# Patient Record
Sex: FEMALE | Race: WHITE | URBAN - METROPOLITAN AREA
[De-identification: names, ages, dates, MRNs, and addresses within clinical notes are randomized per-mention and may not be internally consistent; named-entity substitution may affect disease eponyms.]

---

## 2017-06-07 ENCOUNTER — APPOINTMENT (RX ONLY)
Age: 62
Setting detail: DERMATOLOGY
End: 2017-06-07

## 2017-06-07 ENCOUNTER — APPOINTMENT (OUTPATIENT)
Age: 62
Setting detail: DERMATOLOGY
End: 2017-06-07

## 2017-06-07 VITALS
SYSTOLIC BLOOD PRESSURE: 132 MMHG | DIASTOLIC BLOOD PRESSURE: 78 MMHG | SYSTOLIC BLOOD PRESSURE: 132 MMHG | DIASTOLIC BLOOD PRESSURE: 78 MMHG

## 2017-06-07 DIAGNOSIS — Z85.820 PERSONAL HISTORY OF MALIGNANT MELANOMA OF SKIN: ICD-10-CM

## 2017-06-07 DIAGNOSIS — L82.1 OTHER SEBORRHEIC KERATOSIS: ICD-10-CM

## 2017-06-07 DIAGNOSIS — D22 MELANOCYTIC NEVI: ICD-10-CM

## 2017-06-07 PROBLEM — D22.5 MELANOCYTIC NEVI OF TRUNK: Status: ACTIVE | Noted: 2017-06-07

## 2017-06-07 PROBLEM — I10 ESSENTIAL (PRIMARY) HYPERTENSION: Status: ACTIVE | Noted: 2017-06-07

## 2017-06-07 PROCEDURE — ? COUNSELING

## 2017-06-07 PROCEDURE — 99214 OFFICE O/P EST MOD 30 MIN: CPT

## 2017-06-07 ASSESSMENT — LOCATION DETAILED DESCRIPTION DERM
LOCATION DETAILED: EPIGASTRIC SKIN
LOCATION DETAILED: RIGHT MEDIAL UPPER BACK
LOCATION DETAILED: RIGHT SUPERIOR MEDIAL MIDBACK
LOCATION DETAILED: RIGHT PROXIMAL POSTERIOR UPPER ARM
LOCATION DETAILED: RIGHT MEDIAL UPPER BACK
LOCATION DETAILED: RIGHT SUPERIOR MEDIAL MIDBACK
LOCATION DETAILED: RIGHT PROXIMAL POSTERIOR UPPER ARM
LOCATION DETAILED: EPIGASTRIC SKIN

## 2017-06-07 ASSESSMENT — LOCATION ZONE DERM
LOCATION ZONE: ARM
LOCATION ZONE: TRUNK
LOCATION ZONE: TRUNK
LOCATION ZONE: ARM

## 2017-06-07 ASSESSMENT — LOCATION SIMPLE DESCRIPTION DERM
LOCATION SIMPLE: RIGHT UPPER BACK
LOCATION SIMPLE: RIGHT LOWER BACK
LOCATION SIMPLE: RIGHT UPPER BACK
LOCATION SIMPLE: RIGHT POSTERIOR UPPER ARM
LOCATION SIMPLE: RIGHT LOWER BACK
LOCATION SIMPLE: RIGHT POSTERIOR UPPER ARM
LOCATION SIMPLE: ABDOMEN
LOCATION SIMPLE: ABDOMEN

## 2017-06-07 NOTE — PROCEDURE: MIPS QUALITY
Quality 138: Melanoma: Coordination Of Care: A treatment plan was communicated to the physicians providing continuing care within one month of diagnosis outlining: diagnosis, tumor thickness and a plan for surgery or alternate care.
Quality 226: Preventive Care And Screening: Tobacco Use: Screening And Cessation Intervention: Patient screened for tobacco and never smoked
Quality 431: Preventive Care And Screening: Unhealthy Alcohol Use - Screening: Patient screened for unhealthy alcohol use using a single question and scores less than 2 times per year
Quality 397: Melanoma: Reporting: The pathology report includes a pT Category and statement on thickness and ulceration for pT1, mitotic rate.
Quality 111:Pneumonia Vaccination Status For Older Adults: Pneumococcal Vaccination not Administered or Previously Received, Reason not Otherwise Specified
Quality 130: Documentation Of Current Medications In The Medical Record: Current Medications Documented
Quality 224: Stage 0-Iic Melanoma: Overutilization Of Imaging Studies For Only Stage 0-Iic Melanoma: None of the following diagnostic imaging studies ordered: chest X-ray, CT, Ultrasound, MRI, PET, or nuclear medicine scans (ML)
Detail Level: Detailed
Quality 110: Preventive Care And Screening: Influenza Immunization: Influenza Immunization Ordered or Recommended, but not Administered due to system reason
Quality 137: Melanoma: Continuity Of Care - Recall System: Patient information entered into a recall system that includes: target date for the next exam specified AND a process to follow up with patients regarding missed or unscheduled appointments

## 2017-06-07 NOTE — PROCEDURE: MIPS QUALITY
Quality 397: Melanoma: Reporting: The pathology report includes a pT Category and statement on thickness and ulceration for pT1, mitotic rate.
Quality 137: Melanoma: Continuity Of Care - Recall System: Patient information entered into a recall system that includes: target date for the next exam specified AND a process to follow up with patients regarding missed or unscheduled appointments
Quality 431: Preventive Care And Screening: Unhealthy Alcohol Use - Screening: Patient screened for unhealthy alcohol use using a single question and scores less than 2 times per year
Quality 138: Melanoma: Coordination Of Care: A treatment plan was communicated to the physicians providing continuing care within one month of diagnosis outlining: diagnosis, tumor thickness and a plan for surgery or alternate care.
Quality 111:Pneumonia Vaccination Status For Older Adults: Pneumococcal Vaccination not Administered or Previously Received, Reason not Otherwise Specified
Detail Level: Detailed
Quality 224: Stage 0-Iic Melanoma: Overutilization Of Imaging Studies For Only Stage 0-Iic Melanoma: None of the following diagnostic imaging studies ordered: chest X-ray, CT, Ultrasound, MRI, PET, or nuclear medicine scans (ML)
Quality 130: Documentation Of Current Medications In The Medical Record: Current Medications Documented
Quality 226: Preventive Care And Screening: Tobacco Use: Screening And Cessation Intervention: Patient screened for tobacco and never smoked
Quality 110: Preventive Care And Screening: Influenza Immunization: Influenza Immunization Ordered or Recommended, but not Administered due to system reason

## 2018-06-13 ENCOUNTER — APPOINTMENT (OUTPATIENT)
Age: 63
Setting detail: DERMATOLOGY
End: 2018-06-13

## 2018-06-13 ENCOUNTER — APPOINTMENT (RX ONLY)
Age: 63
Setting detail: DERMATOLOGY
End: 2018-06-13

## 2018-06-13 VITALS
SYSTOLIC BLOOD PRESSURE: 170 MMHG | DIASTOLIC BLOOD PRESSURE: 80 MMHG | SYSTOLIC BLOOD PRESSURE: 170 MMHG | DIASTOLIC BLOOD PRESSURE: 80 MMHG

## 2018-06-13 DIAGNOSIS — L57.0 ACTINIC KERATOSIS: ICD-10-CM

## 2018-06-13 DIAGNOSIS — D18.0 HEMANGIOMA: ICD-10-CM

## 2018-06-13 DIAGNOSIS — D22 MELANOCYTIC NEVI: ICD-10-CM

## 2018-06-13 DIAGNOSIS — Z85.820 PERSONAL HISTORY OF MALIGNANT MELANOMA OF SKIN: ICD-10-CM

## 2018-06-13 DIAGNOSIS — L81.4 OTHER MELANIN HYPERPIGMENTATION: ICD-10-CM

## 2018-06-13 PROBLEM — D22.71 MELANOCYTIC NEVI OF RIGHT LOWER LIMB, INCLUDING HIP: Status: ACTIVE | Noted: 2018-06-13

## 2018-06-13 PROBLEM — D22.61 MELANOCYTIC NEVI OF RIGHT UPPER LIMB, INCLUDING SHOULDER: Status: ACTIVE | Noted: 2018-06-13

## 2018-06-13 PROBLEM — D22.39 MELANOCYTIC NEVI OF OTHER PARTS OF FACE: Status: ACTIVE | Noted: 2018-06-13

## 2018-06-13 PROBLEM — I10 ESSENTIAL (PRIMARY) HYPERTENSION: Status: ACTIVE | Noted: 2018-06-13

## 2018-06-13 PROBLEM — D22.62 MELANOCYTIC NEVI OF LEFT UPPER LIMB, INCLUDING SHOULDER: Status: ACTIVE | Noted: 2018-06-13

## 2018-06-13 PROBLEM — D22.72 MELANOCYTIC NEVI OF LEFT LOWER LIMB, INCLUDING HIP: Status: ACTIVE | Noted: 2018-06-13

## 2018-06-13 PROBLEM — D18.01 HEMANGIOMA OF SKIN AND SUBCUTANEOUS TISSUE: Status: ACTIVE | Noted: 2018-06-13

## 2018-06-13 PROBLEM — D22.5 MELANOCYTIC NEVI OF TRUNK: Status: ACTIVE | Noted: 2018-06-13

## 2018-06-13 PROCEDURE — ? COUNSELING

## 2018-06-13 PROCEDURE — 17000 DESTRUCT PREMALG LESION: CPT

## 2018-06-13 PROCEDURE — ? LIQUID NITROGEN

## 2018-06-13 PROCEDURE — 99214 OFFICE O/P EST MOD 30 MIN: CPT | Mod: 25

## 2018-06-13 ASSESSMENT — LOCATION ZONE DERM
LOCATION ZONE: LEG
LOCATION ZONE: TRUNK
LOCATION ZONE: TRUNK
LOCATION ZONE: LEG
LOCATION ZONE: FACE
LOCATION ZONE: ARM
LOCATION ZONE: FACE
LOCATION ZONE: ARM

## 2018-06-13 ASSESSMENT — LOCATION SIMPLE DESCRIPTION DERM
LOCATION SIMPLE: LEFT THIGH
LOCATION SIMPLE: LEFT THIGH
LOCATION SIMPLE: UPPER BACK
LOCATION SIMPLE: LEFT POSTERIOR UPPER ARM
LOCATION SIMPLE: LEFT POSTERIOR UPPER ARM
LOCATION SIMPLE: ABDOMEN
LOCATION SIMPLE: RIGHT PRETIBIAL REGION
LOCATION SIMPLE: RIGHT FOREARM
LOCATION SIMPLE: RIGHT PRETIBIAL REGION
LOCATION SIMPLE: UPPER BACK
LOCATION SIMPLE: RIGHT FOREARM
LOCATION SIMPLE: CHEST
LOCATION SIMPLE: RIGHT CHEEK
LOCATION SIMPLE: CHEST
LOCATION SIMPLE: ABDOMEN
LOCATION SIMPLE: RIGHT CHEEK
LOCATION SIMPLE: LEFT PRETIBIAL REGION
LOCATION SIMPLE: RIGHT POSTERIOR UPPER ARM
LOCATION SIMPLE: RIGHT POSTERIOR UPPER ARM
LOCATION SIMPLE: LEFT PRETIBIAL REGION

## 2018-06-13 ASSESSMENT — LOCATION DETAILED DESCRIPTION DERM
LOCATION DETAILED: RIGHT PROXIMAL POSTERIOR UPPER ARM
LOCATION DETAILED: RIGHT PROXIMAL RADIAL DORSAL FOREARM
LOCATION DETAILED: RIGHT LATERAL ABDOMEN
LOCATION DETAILED: LEFT PROXIMAL POSTERIOR UPPER ARM
LOCATION DETAILED: RIGHT PROXIMAL PRETIBIAL REGION
LOCATION DETAILED: RIGHT PROXIMAL RADIAL DORSAL FOREARM
LOCATION DETAILED: SUPERIOR THORACIC SPINE
LOCATION DETAILED: EPIGASTRIC SKIN
LOCATION DETAILED: RIGHT INFERIOR CENTRAL MALAR CHEEK
LOCATION DETAILED: LEFT DISTAL PRETIBIAL REGION
LOCATION DETAILED: LEFT ANTERIOR DISTAL THIGH
LOCATION DETAILED: RIGHT PROXIMAL PRETIBIAL REGION
LOCATION DETAILED: RIGHT INFERIOR CENTRAL MALAR CHEEK
LOCATION DETAILED: RIGHT PROXIMAL POSTERIOR UPPER ARM
LOCATION DETAILED: INFERIOR THORACIC SPINE
LOCATION DETAILED: LEFT PROXIMAL POSTERIOR UPPER ARM
LOCATION DETAILED: EPIGASTRIC SKIN
LOCATION DETAILED: INFERIOR THORACIC SPINE
LOCATION DETAILED: RIGHT MEDIAL SUPERIOR CHEST
LOCATION DETAILED: RIGHT SUPERIOR CENTRAL MALAR CHEEK
LOCATION DETAILED: RIGHT SUPERIOR CENTRAL MALAR CHEEK
LOCATION DETAILED: MIDDLE STERNUM
LOCATION DETAILED: RIGHT MEDIAL SUPERIOR CHEST
LOCATION DETAILED: RIGHT LATERAL ABDOMEN
LOCATION DETAILED: LEFT ANTERIOR DISTAL THIGH
LOCATION DETAILED: LEFT DISTAL PRETIBIAL REGION
LOCATION DETAILED: MIDDLE STERNUM
LOCATION DETAILED: SUPERIOR THORACIC SPINE

## 2018-06-13 NOTE — PROCEDURE: MIPS QUALITY
Quality 226: Preventive Care And Screening: Tobacco Use: Screening And Cessation Intervention: Patient screened for tobacco and never smoked
Quality 431: Preventive Care And Screening: Unhealthy Alcohol Use - Screening: Patient screened for unhealthy alcohol use using a single question and scores less than 2 times per year
Quality 110: Preventive Care And Screening: Influenza Immunization: Influenza Immunization Ordered or Recommended, but not Administered due to system reason
Quality 111:Pneumonia Vaccination Status For Older Adults: Pneumococcal Vaccination not Administered or Previously Received, Reason not Otherwise Specified
Detail Level: Detailed

## 2018-06-13 NOTE — PROCEDURE: MIPS QUALITY
Quality 431: Preventive Care And Screening: Unhealthy Alcohol Use - Screening: Patient screened for unhealthy alcohol use using a single question and scores less than 2 times per year
Quality 110: Preventive Care And Screening: Influenza Immunization: Influenza Immunization Ordered or Recommended, but not Administered due to system reason
Quality 111:Pneumonia Vaccination Status For Older Adults: Pneumococcal Vaccination not Administered or Previously Received, Reason not Otherwise Specified
Quality 226: Preventive Care And Screening: Tobacco Use: Screening And Cessation Intervention: Patient screened for tobacco and never smoked
Detail Level: Detailed

## 2020-01-30 RX ORDER — SODIUM CHLORIDE 0.9 % (FLUSH) 0.9 %
5-40 SYRINGE (ML) INJECTION AS NEEDED
Status: CANCELLED | OUTPATIENT
Start: 2020-01-30

## 2020-01-30 RX ORDER — SODIUM CHLORIDE 0.9 % (FLUSH) 0.9 %
5-40 SYRINGE (ML) INJECTION EVERY 8 HOURS
Status: CANCELLED | OUTPATIENT
Start: 2020-01-30

## 2020-02-05 ENCOUNTER — HOSPITAL ENCOUNTER (OUTPATIENT)
Dept: LAB | Age: 65
Discharge: HOME | End: 2020-02-05
Payer: MEDICARE

## 2020-02-05 LAB — POTASSIUM SERPL-SCNC: 3.3 MMOL/L (ref 3.5–5.1)

## 2020-02-05 PROCEDURE — 84132 ASSAY OF SERUM POTASSIUM: CPT

## 2020-02-05 PROCEDURE — 36415 COLL VENOUS BLD VENIPUNCTURE: CPT

## 2020-02-06 ENCOUNTER — ANESTHESIA EVENT (OUTPATIENT)
Dept: SURGERY | Age: 65
End: 2020-02-06
Payer: MEDICARE

## 2020-02-07 ENCOUNTER — ANESTHESIA (OUTPATIENT)
Dept: SURGERY | Age: 65
End: 2020-02-07
Payer: MEDICARE

## 2020-02-07 ENCOUNTER — HOSPITAL ENCOUNTER (OUTPATIENT)
Age: 65
Setting detail: OUTPATIENT SURGERY
Discharge: HOME | End: 2020-02-07
Attending: ORTHOPAEDIC SURGERY | Admitting: ORTHOPAEDIC SURGERY
Payer: MEDICARE

## 2020-02-07 VITALS
BODY MASS INDEX: 30.51 KG/M2 | WEIGHT: 189 LBS | OXYGEN SATURATION: 93 % | DIASTOLIC BLOOD PRESSURE: 78 MMHG | TEMPERATURE: 98 F | HEART RATE: 83 BPM | SYSTOLIC BLOOD PRESSURE: 140 MMHG | RESPIRATION RATE: 14 BRPM

## 2020-02-07 LAB — POTASSIUM BLD-SCNC: 3.8 MMOL/L (ref 3.5–5.1)

## 2020-02-07 PROCEDURE — 77030004453 HC BUR SHV STRY -B: Performed by: ORTHOPAEDIC SURGERY

## 2020-02-07 PROCEDURE — 77030010509 HC AIRWY LMA MSK TELE -A: Performed by: ANESTHESIOLOGY

## 2020-02-07 PROCEDURE — 74011250636 HC RX REV CODE- 250/636: Performed by: ANESTHESIOLOGY

## 2020-02-07 PROCEDURE — 76010010054 HC POST OP PAIN BLOCK: Performed by: ORTHOPAEDIC SURGERY

## 2020-02-07 PROCEDURE — 77030019605: Performed by: ORTHOPAEDIC SURGERY

## 2020-02-07 PROCEDURE — 76210000063 HC OR PH I REC FIRST 0.5 HR: Performed by: ORTHOPAEDIC SURGERY

## 2020-02-07 PROCEDURE — 76942 ECHO GUIDE FOR BIOPSY: CPT | Performed by: ORTHOPAEDIC SURGERY

## 2020-02-07 PROCEDURE — 74011250636 HC RX REV CODE- 250/636: Performed by: ORTHOPAEDIC SURGERY

## 2020-02-07 PROCEDURE — 76060000033 HC ANESTHESIA 1 TO 1.5 HR: Performed by: ORTHOPAEDIC SURGERY

## 2020-02-07 PROCEDURE — 77030040936 HC WND COBLATN S&N -C: Performed by: ORTHOPAEDIC SURGERY

## 2020-02-07 PROCEDURE — 74011000250 HC RX REV CODE- 250: Performed by: NURSE ANESTHETIST, CERTIFIED REGISTERED

## 2020-02-07 PROCEDURE — 74011250636 HC RX REV CODE- 250/636: Performed by: NURSE ANESTHETIST, CERTIFIED REGISTERED

## 2020-02-07 PROCEDURE — 74011000250 HC RX REV CODE- 250: Performed by: ANESTHESIOLOGY

## 2020-02-07 PROCEDURE — C1713 ANCHOR/SCREW BN/BN,TIS/BN: HCPCS | Performed by: ORTHOPAEDIC SURGERY

## 2020-02-07 PROCEDURE — 77030018836 HC SOL IRR NACL ICUM -A: Performed by: ORTHOPAEDIC SURGERY

## 2020-02-07 PROCEDURE — 84132 ASSAY OF SERUM POTASSIUM: CPT

## 2020-02-07 PROCEDURE — 77030002933 HC SUT MCRYL J&J -A: Performed by: ORTHOPAEDIC SURGERY

## 2020-02-07 PROCEDURE — 77030040361 HC SLV COMPR DVT MDII -B: Performed by: ORTHOPAEDIC SURGERY

## 2020-02-07 PROCEDURE — 77030003666 HC NDL SPINAL BD -A: Performed by: ORTHOPAEDIC SURGERY

## 2020-02-07 PROCEDURE — 76210000020 HC REC RM PH II FIRST 0.5 HR: Performed by: ORTHOPAEDIC SURGERY

## 2020-02-07 PROCEDURE — 77030003602 HC NDL NRV BLK BBMI -B: Performed by: ANESTHESIOLOGY

## 2020-02-07 PROCEDURE — 74011250637 HC RX REV CODE- 250/637: Performed by: ANESTHESIOLOGY

## 2020-02-07 PROCEDURE — 76010000161 HC OR TIME 1 TO 1.5 HR INTENSV-TIER 1: Performed by: ORTHOPAEDIC SURGERY

## 2020-02-07 PROCEDURE — 77030006891 HC BLD SHV RESECT STRY -B: Performed by: ORTHOPAEDIC SURGERY

## 2020-02-07 RX ORDER — OXYCODONE HYDROCHLORIDE 5 MG/1
5 TABLET ORAL
Status: DISCONTINUED | OUTPATIENT
Start: 2020-02-07 | End: 2020-02-07 | Stop reason: HOSPADM

## 2020-02-07 RX ORDER — LIDOCAINE HYDROCHLORIDE 10 MG/ML
0.1 INJECTION INFILTRATION; PERINEURAL AS NEEDED
Status: DISCONTINUED | OUTPATIENT
Start: 2020-02-07 | End: 2020-02-07 | Stop reason: HOSPADM

## 2020-02-07 RX ORDER — ONDANSETRON 2 MG/ML
4 INJECTION INTRAMUSCULAR; INTRAVENOUS
Status: DISCONTINUED | OUTPATIENT
Start: 2020-02-07 | End: 2020-02-07 | Stop reason: HOSPADM

## 2020-02-07 RX ORDER — ALBUTEROL SULFATE 0.83 MG/ML
2.5 SOLUTION RESPIRATORY (INHALATION) AS NEEDED
Status: DISCONTINUED | OUTPATIENT
Start: 2020-02-07 | End: 2020-02-07 | Stop reason: HOSPADM

## 2020-02-07 RX ORDER — ACETAMINOPHEN 500 MG
1000 TABLET ORAL ONCE
Status: COMPLETED | OUTPATIENT
Start: 2020-02-07 | End: 2020-02-07

## 2020-02-07 RX ORDER — PROPOFOL 10 MG/ML
INJECTION, EMULSION INTRAVENOUS AS NEEDED
Status: DISCONTINUED | OUTPATIENT
Start: 2020-02-07 | End: 2020-02-07 | Stop reason: HOSPADM

## 2020-02-07 RX ORDER — BUPIVACAINE HYDROCHLORIDE 5 MG/ML
INJECTION, SOLUTION EPIDURAL; INTRACAUDAL
Status: COMPLETED | OUTPATIENT
Start: 2020-02-07 | End: 2020-02-07

## 2020-02-07 RX ORDER — EPINEPHRINE 0.1 MG/ML
INJECTION INTRACARDIAC; INTRAVENOUS AS NEEDED
Status: DISCONTINUED | OUTPATIENT
Start: 2020-02-07 | End: 2020-02-07 | Stop reason: HOSPADM

## 2020-02-07 RX ORDER — CEFAZOLIN SODIUM/WATER 2 G/20 ML
2 SYRINGE (ML) INTRAVENOUS ONCE
Status: COMPLETED | OUTPATIENT
Start: 2020-02-07 | End: 2020-02-07

## 2020-02-07 RX ORDER — SODIUM CHLORIDE, SODIUM LACTATE, POTASSIUM CHLORIDE, CALCIUM CHLORIDE 600; 310; 30; 20 MG/100ML; MG/100ML; MG/100ML; MG/100ML
1000 INJECTION, SOLUTION INTRAVENOUS CONTINUOUS
Status: DISCONTINUED | OUTPATIENT
Start: 2020-02-07 | End: 2020-02-07 | Stop reason: HOSPADM

## 2020-02-07 RX ORDER — SODIUM CHLORIDE 0.9 % (FLUSH) 0.9 %
5-40 SYRINGE (ML) INJECTION AS NEEDED
Status: DISCONTINUED | OUTPATIENT
Start: 2020-02-07 | End: 2020-02-07 | Stop reason: HOSPADM

## 2020-02-07 RX ORDER — MIDAZOLAM HYDROCHLORIDE 1 MG/ML
2 INJECTION, SOLUTION INTRAMUSCULAR; INTRAVENOUS
Status: COMPLETED | OUTPATIENT
Start: 2020-02-07 | End: 2020-02-07

## 2020-02-07 RX ORDER — HYDROMORPHONE HYDROCHLORIDE 2 MG/ML
0.5 INJECTION, SOLUTION INTRAMUSCULAR; INTRAVENOUS; SUBCUTANEOUS
Status: DISCONTINUED | OUTPATIENT
Start: 2020-02-07 | End: 2020-02-07 | Stop reason: HOSPADM

## 2020-02-07 RX ORDER — LIDOCAINE HYDROCHLORIDE 20 MG/ML
INJECTION, SOLUTION EPIDURAL; INFILTRATION; INTRACAUDAL; PERINEURAL AS NEEDED
Status: DISCONTINUED | OUTPATIENT
Start: 2020-02-07 | End: 2020-02-07 | Stop reason: HOSPADM

## 2020-02-07 RX ORDER — EPHEDRINE SULFATE/0.9% NACL/PF 50 MG/5 ML
SYRINGE (ML) INTRAVENOUS AS NEEDED
Status: DISCONTINUED | OUTPATIENT
Start: 2020-02-07 | End: 2020-02-07 | Stop reason: HOSPADM

## 2020-02-07 RX ORDER — SODIUM CHLORIDE 0.9 % (FLUSH) 0.9 %
5-40 SYRINGE (ML) INJECTION EVERY 8 HOURS
Status: DISCONTINUED | OUTPATIENT
Start: 2020-02-07 | End: 2020-02-07 | Stop reason: HOSPADM

## 2020-02-07 RX ADMIN — ACETAMINOPHEN 1000 MG: 500 TABLET, FILM COATED ORAL at 10:00

## 2020-02-07 RX ADMIN — LIDOCAINE HYDROCHLORIDE 100 MG: 20 INJECTION, SOLUTION EPIDURAL; INFILTRATION; INTRACAUDAL; PERINEURAL at 10:42

## 2020-02-07 RX ADMIN — BUPIVACAINE HYDROCHLORIDE 15 ML: 5 INJECTION, SOLUTION EPIDURAL; INTRACAUDAL; PERINEURAL at 10:18

## 2020-02-07 RX ADMIN — PROPOFOL 200 MG: 10 INJECTION, EMULSION INTRAVENOUS at 10:42

## 2020-02-07 RX ADMIN — Medication 10 MG: at 10:42

## 2020-02-07 RX ADMIN — MIDAZOLAM 2 MG: 1 INJECTION INTRAMUSCULAR; INTRAVENOUS at 10:11

## 2020-02-07 RX ADMIN — SODIUM CHLORIDE, SODIUM LACTATE, POTASSIUM CHLORIDE, AND CALCIUM CHLORIDE 1000 ML: 600; 310; 30; 20 INJECTION, SOLUTION INTRAVENOUS at 09:30

## 2020-02-07 RX ADMIN — Medication 2 G: at 10:52

## 2020-02-07 RX ADMIN — PHENYLEPHRINE HYDROCHLORIDE 150 MCG: 10 INJECTION INTRAVENOUS at 11:14

## 2020-02-07 RX ADMIN — PHENYLEPHRINE HYDROCHLORIDE 200 MCG: 10 INJECTION INTRAVENOUS at 11:21

## 2020-02-07 RX ADMIN — SODIUM CHLORIDE, SODIUM LACTATE, POTASSIUM CHLORIDE, AND CALCIUM CHLORIDE: 600; 310; 30; 20 INJECTION, SOLUTION INTRAVENOUS at 11:15

## 2020-02-07 RX ADMIN — SODIUM CHLORIDE, SODIUM LACTATE, POTASSIUM CHLORIDE, AND CALCIUM CHLORIDE: 600; 310; 30; 20 INJECTION, SOLUTION INTRAVENOUS at 10:36

## 2020-02-07 NOTE — H&P
Outpatient Surgery History and Physical:  Byron Montenegro was seen and examined. CHIEF COMPLAINT:    RT shoulder . PE:   There were no vitals taken for this visit. Heart:   Regular rhythm      Lungs:  Are clear      Past Medical History: There are no active problems to display for this patient. Surgical History:   Past Surgical History:   Procedure Laterality Date    HX BREAST REDUCTION      HX BUNIONECTOMY      bilat    HX YOAN AND BSO  2006    \"and prolapse surgery for cystocele and rectocele\"    HX TUBAL LIGATION         Social History: Patient  reports that she has never smoked. She has never used smokeless tobacco. She reports current alcohol use of about 2.5 standard drinks of alcohol per week. She reports previous drug use. Family History:   Family History   Problem Relation Age of Onset    Cancer Mother         breast    Heart Disease Father        Allergies: Reviewed per EMR  Allergies   Allergen Reactions    Adhesive Hives       Medications:    No current facility-administered medications on file prior to encounter. Current Outpatient Medications on File Prior to Encounter   Medication Sig    multivitamin capsule Take 1 Cap by mouth daily.  OTHER,NON-FORMULARY, daily. Tumeric/tellez        The surgery is planned for the  RT shoulder . History and physical has been reviewed. The patient has been examined. There have been no significant clinical changes since the completion of the originally dated History and Physical.  Patient identified by surgeon; surgical site was confirmed by patient and surgeon. The patient is here today for outpatient surgery. I have examined the patient, no changes are noted in the patient's medical status. Necessity for the procedure/care is still present and the history and physical above is current. See the office notes for the full long term history of the problem.   Please see the recent office notes for the musculoskeletal examination.     Signed By: Karmen Eric MD     February 7, 2020 7:14 AM

## 2020-02-07 NOTE — ANESTHESIA POSTPROCEDURE EVALUATION
Procedure(s):  RIGHT SHOULDER ARTHROSCOPY DISTAL CLAVICAL REPAIR/CHONOPLASTY/BICEPS TENODOMY.     general, regional    Anesthesia Post Evaluation      Multimodal analgesia: multimodal analgesia used between 6 hours prior to anesthesia start to PACU discharge  Patient location during evaluation: bedside  Patient participation: complete - patient participated  Level of consciousness: awake and responsive to light touch  Pain management: adequate  Airway patency: patent  Anesthetic complications: no  Cardiovascular status: acceptable, hemodynamically stable, blood pressure returned to baseline and stable  Respiratory status: acceptable, unassisted, spontaneous ventilation and nonlabored ventilation  Hydration status: acceptable  Post anesthesia nausea and vomiting:  controlled      Vitals Value Taken Time   /71 2/7/2020 11:49 AM   Temp 36.2 °C (97.1 °F) 2/7/2020 11:44 AM   Pulse 90 2/7/2020 11:49 AM   Resp 16 2/7/2020 11:49 AM   SpO2 93 % 2/7/2020 11:49 AM

## 2020-02-07 NOTE — DISCHARGE INSTRUCTIONS
Post-Operative Instructions   For  Shoulder Arthroscopy  Phone:  [phone]    1. Apply ice to the shoulder as needed. 2. You may shower after the arthroscopy. Keep dressings in place for the first three days and do not get them wet. After three days, you may remove the dressings and leave the steri-strip bandages in place; they will peel off naturally. 3. You may discontinue use of your sling and begin active range of motion of the elbow as tolerated by pain, unless you are instructed otherwise. Do not lift arm by your side for 4 weeks. 4. Begin therapy as ordered. 5. Use any pain medication as instructed. You should take your pain medication as soon as you feel the anesthetic wearing off. Do not wait until you are in severe pain to begin taking your pain medication. 6. You may have some side effects from your pain medication. If you have nausea, try taking your medication with food. For itching, you may take over the counter Benadryl. 7. You may have been given a prescription for Phenergan. This medication is used for nausea and vomiting. You do not need to get this prescription filled unless you have a problem. 8. If you have a problem, please call [address] [place] [address] at [phone], P.A.     TYPICAL SIDE EFFECTS OF PAIN MEDICATION:  *    Constipation: Drink lots of fluids. Over the counter stool softener if needed. *    Nausea: Take pain medication with food. Call your doctor with persistent nausea. ACTIVITY  · As tolerated and as directed by your doctor. · Bathe or shower as directed by your doctor. DIET  · Day of surgery: Clear liquids until no nausea or vomiting; small portion, light diet [place] foods (ex: baked chicken, plain rice, grits, scrambled eggs, toast). Nothing greasy, fried or spicy today. · Advance to regular diet on second day, unless your doctor orders otherwise.    · If nausea and vomiting continues, call your doctor. PAIN  · Take pain medication as directed by your doctor. · DO NOT take aspirin or blood thinners unless directed by your doctor. CALL YOUR DOCTOR IF    s Call your doctor if pain is NOT relieved by medication.   s Excessive bleeding that does not stop after holding pressure over the area  · Temperature of 101 degrees F or above  · Excessive redness, swelling or bruising, and/ or green or yellow, smelly discharge from incision    AFTER ANESTHESIA   · For the first 24 hours: DO NOT Drive, Drink alcoholic beverages, or Make important decisions. · Be aware of dizziness following anesthesia and while taking pain medication. DISCHARGE SUMMARY from Nurse    PATIENT INSTRUCTIONS:    After general anesthesia or intravenous sedation, for 24 hours or while taking prescription Narcotics:  · Limit your activities  · Do not drive and operate hazardous machinery  · Do not make important personal or business decisions  · Do  not drink alcoholic beverages  · If you have not urinated within 8 hours after discharge, please contact your surgeon on call. *  Please give a list of your current medications to your Primary Care Provider. *  Please update this list whenever your medications are discontinued, doses are      changed, or new medications (including over-the-counter products) are added. *  Please carry medication information at all times in case of emergency situations. Preventing Infection at Home  We care about preventing infection and avoiding the spread of germs - not only when you are in the hospital but also when you return home. When you return home from the hospital, its important to take the following steps to help prevent infection and avoid spreading germs that could infect you and others. Ask everyone in your home to follow these guidelines, too.     Clean Your Hands  · Clean your hands whenever your hands are visibly dirty, before you eat, before or after touching your mouth, nose or eyes, and before preparing food. Clean them after contact with body fluids, using the restroom, touching animals or changing diapers. · When washing hands, wet them with warm water and work up a lather. Rub hands for at least 15 seconds, then rinse them and pat them dry with a clean towel or paper towel. · When using hand sanitizers, it should take about 15 seconds to rub your hands dry. If not, you probably didnt apply enough . Cover Your Sneeze or Cough  Germs are released into the air whenever you sneeze or cough. To prevent the spread of infection:  · Turn away from other people before coughing or sneezing. · Cover your mouth or nose with a tissue when you cough or sneeze. Put the tissue in the trash. · If you dont have a tissue, cough or sneeze into your upper sleeve, not your hands. · Always clean your hands after coughing or sneezing. Care for Wounds  Your skin is your bodys first line of defense against germs, but an open wound leaves an easy way for germs to enter your body. To prevent infection:  · Clean your hands before and after changing wound dressings, and wear gloves to change dressings if recommended by your doctor. · Take special care with IV lines or other devices inserted into the body. If you must touch them, clean your hands first.  · Follow any specific instructions from your doctor to care for your wounds. Contact your doctor if you experience any signs of infection, such as fever or increased redness at the surgical or wound site. Keep a Clean Home  · Clean or wipe commonly touched hard surfaces like door handles, sinks, tabletops, phones and TV remotes. · Use products labeled disinfectant to kill harmful bacteria and viruses. · Use a clean cloth or paper towel to clean and dry surfaces. Wiping surfaces with a dirty dishcloth, sponge or towel will only spread germs.   · Never share toothbrushes, joe, drinking glasses, utensils, razor blades, face cloths or bath towels to avoid spreading germs. · Be sure that the linens that you sleep on are clean. · Keep pets away from wounds and wash your hands after touching pets, their toys or bedding. We care about you and your health. Remember, preventing infections is a team effort between you, your family, friends and health care providers. These are general instructions for a healthy lifestyle:    No smoking/ No tobacco products/ Avoid exposure to second hand smoke    Surgeon General's Warning:  Quitting smoking now greatly reduces serious risk to your health. Obesity, smoking, and sedentary lifestyle greatly increases your risk for illness    A healthy diet, regular physical exercise & weight monitoring are important for maintaining a healthy lifestyle    You may be retaining fluid if you have a history of heart failure or if you experience any of the following symptoms:  Weight gain of 3 pounds or more overnight or 5 pounds in a week, increased swelling in our hands or feet or shortness of breath while lying flat in bed. Please call your doctor as soon as you notice any of these symptoms; do not wait until your next office visit. Recognize signs and symptoms of STROKE:    F-face looks uneven    A-arms unable to move or move unevenly    S-speech slurred or non-existent    T-time-call 911 as soon as signs and symptoms begin-DO NOT go       Back to bed or wait to see if you get better-TIME IS BRAIN.

## 2020-02-07 NOTE — H&P
Outpatient Surgery History and Physical:  Akhil Daugherty was seen and examined. CHIEF COMPLAINT:    R shoulder . PE:   There were no vitals taken for this visit. Heart:   Regular rhythm      Lungs:  Are clear      Past Medical History: There are no active problems to display for this patient. Surgical History:   Past Surgical History:   Procedure Laterality Date    HX BREAST REDUCTION      HX BUNIONECTOMY      bilat    HX YOAN AND BSO  2006    \"and prolapse surgery for cystocele and rectocele\"    HX TUBAL LIGATION         Social History: Patient  reports that she has never smoked. She has never used smokeless tobacco. She reports current alcohol use of about 2.5 standard drinks of alcohol per week. She reports previous drug use. Family History:   Family History   Problem Relation Age of Onset    Cancer Mother         breast    Heart Disease Father        Allergies: Reviewed per EMR  Allergies   Allergen Reactions    Adhesive Hives       Medications:    No current facility-administered medications on file prior to encounter. Current Outpatient Medications on File Prior to Encounter   Medication Sig    multivitamin capsule Take 1 Cap by mouth daily.  OTHER,NON-FORMULARY, daily. Tumeric/tellez        The surgery is planned for the  RT shoulder. History and physical has been reviewed. The patient has been examined. There have been no significant clinical changes since the completion of the originally dated History and Physical.  Patient identified by surgeon; surgical site was confirmed by patient and surgeon. The patient is here today for outpatient surgery. I have examined the patient, no changes are noted in the patient's medical status. Necessity for the procedure/care is still present and the history and physical above is current. See the office notes for the full long term history of the problem.   Please see the recent office notes for the musculoskeletal examination.     Signed By: Shazia Vasquez MD     February 7, 2020 7:13 AM

## 2020-02-07 NOTE — OP NOTES
[facility]  OPERATIVE REPORT    Name:  Mari Phelan  MR#:  [id number]  :  1955  ACCOUNT #:  [de-identified]  DATE OF SERVICE:  2020    PREOPERATIVE DIAGNOSIS:  Possible rotator cuff tear. POSTOPERATIVE DIAGNOSES:  1. Degenerative arthritis of the glenohumeral joint  2. Large biceps tear. 3.  Acromioclavicular joint arthritis. 4.  Impingement of the right shoulder. 5.  Bursal tear of the rotator cuff. PROCEDURE PERFORMED:  1. Arthroscopic chondroplasty, abrasion arthroplasty of the right shoulder for early degenerative arthritis. 12625  2. Biceps tenotomy [id number]  3. Arthroscopic resection of the distal clavicle. 31546  4. Subacromial decompression of the right shoulder. 42226    SURGEON:  Glendy Lee MD    ASSISTANT:  None. ANESTHESIA:  General.    COMPLICATIONS:  None. SPECIMENS REMOVED:  None. IMPLANTS:  None. ESTIMATED BLOOD LOSS:  Minimal.    PROCEDURE:  After an adequate level of general anesthesia was obtained, the right shoulder was prepped and draped in the usual sterile fashion. She had good motion of the shoulder. Had a block per Anesthesia for postop pain management and received antibiotics. The joint was distended posteriorly with saline. The arthroscope introduced. The patient was noted to have some small loose bodies in the joint. An anterior portal was made in the soft spot. She had marked tearing of the biceps with just a few remaining fibers intact. Photos made for the patient. It was elected to perform a biceps tenotomy. She had degenerative changes in the glenohumeral joint with loose bodies present. This was mainly on the humeral head. Some of the delaminating flaps were debrided and an abrasion arthroplasty was performed to some bleeding bone. She had lesser changes on the glenoid. The arthroscope was placed in the subacromial space and a lateral portal was made.   The patient had marked hooking of the acromion and a decompression was performed with the shaver and the bur. She had some large anterior and lateral osteophytes. She had degenerative changes noted on the end of the distal clavicle. Rotator cuff was well visualized. She had some thinning, but she still had good integrity and this area was debrided, but she did not have a full-thickness tear that mandated a repair and photos made for the patient. She had degenerative changes noted on the end of distal clavicle. Circumferentially, soft tissue was removed and then the inferior osteophytes removed about a centimeter from the inferior portion of the bone and then through an anterior portal with pressure applied superiorly, the superior bone was removed with resection of the distal clavicle using a bur. The shoulder was then copiously irrigated. Steri-Strips, sterile dressings applied. The arm was placed in a sling. Tolerated the procedure well.       Lakeshia Rome MD      [id number]  D:  02/07/2020 11:26  T:  02/07/2020 12:12  JOB #:  [id number]  CC:  [address] [place] [address]

## 2020-02-07 NOTE — ANESTHESIA PROCEDURE NOTES
Peripheral Block    Start time: 2/7/2020 10:11 AM  End time: 2/7/2020 10:18 AM  Performed by: Santo Frias MD  Authorized by: Santo Frias MD       Pre-procedure: Indications: at surgeon's request and post-op pain management    Preanesthetic Checklist: patient identified, risks and benefits discussed, site marked, timeout performed, anesthesia consent given and patient being monitored    Timeout Time: 10:11          Block Type:   Block Type:   Interscalene  Laterality:  Right  Monitoring:  Standard ASA monitoring, continuous pulse ox, frequent vital sign checks, heart rate, responsive to questions and oxygen  Injection Technique:  Single shot  Procedures: ultrasound guided    Patient Position: seated  Prep: chlorhexidine    Location:  Interscalene  Needle Type:  Stimuplex  Needle Gauge:  21 G  Needle Localization:  Ultrasound guidance    Assessment:  Number of attempts:  1  Injection Assessment:  Incremental injection every 5 mL, negative aspiration for CSF, ultrasound image on chart, no intravascular symptoms, negative aspiration for blood, local visualized surrounding nerve on ultrasound and no paresthesia  Patient tolerance:  Patient tolerated the procedure well with no immediate complications

## 2020-02-07 NOTE — BRIEF OP NOTE
BRIEF OPERATIVE NOTE    Date of Procedure: 2/7/2020   Preoperative Diagnosis: Strain of musc/tend the rotator cuff of right shoulder, init [S46.011A]  Postoperative Diagnosis: Strain of musc/tend the rotator cuff of right shoulder, init [S46.011A]    Procedure(s):  RIGHT SHOULDER ARTHROSCOPY DISTAL CLAVICAL REPAIR/CHONOPLASTY/BICEPS TENODOMY  Surgeon(s) and Role:      Rene Ryan MD - Primary         Surgical Assistant:       Surgical Staff:  Circ-1: Lorin Stewart RN  Scrub Tech-1: Carmen Eugene  Event Time In Time Out   Incision Start 1059    Incision Close 1123      Anesthesia: General   Estimated Blood Loss:     Specimens: * No specimens in log *   Findings:      Complications:       Implants: * No implants in log *

## 2020-02-07 NOTE — ANESTHESIA PREPROCEDURE EVALUATION
Relevant Problems   No relevant active problems       Anesthetic History   No history of anesthetic complications            Review of Systems / Medical History  Patient summary reviewed and pertinent labs reviewed    Pulmonary  Within defined limits                 Neuro/Psych   Within defined limits           Cardiovascular    Hypertension              Exercise tolerance: >4 METS     GI/Hepatic/Renal  Within defined limits              Endo/Other        Obesity and arthritis     Other Findings              Physical Exam    Airway  Mallampati: II  TM Distance: 4 - 6 cm  Neck ROM: normal range of motion   Mouth opening: Normal     Cardiovascular    Rhythm: regular  Rate: normal      Pertinent negatives: No murmur   Dental  No notable dental hx       Pulmonary  Breath sounds clear to auscultation               Abdominal         Other Findings            Anesthetic Plan    ASA: 2  Anesthesia type: general      Post-op pain plan if not by surgeon: peripheral nerve block single    Induction: Intravenous  Anesthetic plan and risks discussed with: Patient      LMA

## 2020-08-15 ENCOUNTER — HOSPITAL ENCOUNTER (OUTPATIENT)
Dept: MAMMOGRAPHY | Age: 65
Discharge: HOME | End: 2020-08-15
Attending: INTERNAL MEDICINE
Payer: MEDICARE

## 2020-08-15 DIAGNOSIS — Z12.31 VISIT FOR SCREENING MAMMOGRAM: ICD-10-CM

## 2020-08-15 PROCEDURE — 77063 BREAST TOMOSYNTHESIS BI: CPT

## 2021-02-17 ENCOUNTER — HOSPITAL ENCOUNTER (OUTPATIENT)
Dept: PHYSICAL THERAPY | Age: 66
Discharge: HOME | End: 2021-02-17
Payer: MEDICARE

## 2021-02-17 PROCEDURE — 97161 PT EVAL LOW COMPLEX 20 MIN: CPT

## 2021-02-17 PROCEDURE — 97110 THERAPEUTIC EXERCISES: CPT

## 2021-02-17 NOTE — PROGRESS NOTES
Agus Burnett  : 1955  [place] / [place] / Product Type: Medicare /  Patricio Keiry at [address] [place] [address]. [facility], [address], [facility], [address] [place] [address]  [phone]   [phone]                                         OUTPATIENT PHYSICAL THERAPY: Daily Treatment Note 2021 Visit Count:  1    Treatment Diagnosis:Pain in right hip (M25.551)  Other bursitis of hip, right hip (M70.71)  Pelvic and perineal pain (R10.2)  Muscle weakness (generalized) (M62.81)  Precautions/Allergies:   Adhesive   MD Orders: Eval and Treat  MEDICAL/REFERRING DIAGNOSIS:  Other bursitis of hip, right hip [M70.71]  DATE OF ONSET: Chronic 10 yrs plus  REFERRING PHYSICIAN: Rosendo Espino MD  RETURN PHYSICIAN APPOINTMENT: TBD by patient, MD to f/u with call        Pre-treatment Symptoms/Complaints: See Initial Eval Dated 21 for more details. Pain: Initial:4/10  Medications Last Reviewed:  2021     Post Session: 4/10   Updated Objective Findings: See Initial Eval for more details. TREATMENT:   THERAPEUTIC EXERCISE: ( 25 minutes):  Exercises per grid below to improve mobility, strength and balance. Required minimal visual, verbal and manual cues to promote proper body alignment and promote proper body posture. Progressed resistance and complexity of movement as indicated. Date:  2021 Date:   Date:     Activity/Exercise Parameters Parameters Parameters   Education HEP, POC, PT goals, anatomy/pathology     Hook lying Pelvic Floor 5 min with breath instruction     Reverse Clam 10x     Piriformis stretch Supine and seated  2 min     Box Squat 22 in  10x                       THERAPEUTIC ACTIVITY: ( 0 minutes): Activities per gid below to improve functional movement related mobility, strength and balance to improve neuro-muscular carryover to daily functional activities for improving patient's quality of life.  Required visual, verbal and manual cues to promote proper body alignment and promote proper body posture/mechanics. Progressed resistance and complexity of movement as indicated. Date:  2/17/2021 Date:   Date:     Activity/Exercise Parameters Parameters Parameters                                                                               MANUAL THERAPY: (0 minutes): Joint mobilization, Soft tissue mobilization was utilized and necessary because of the patient's restricted joint motion and restricted motion of soft tissue mobility. Date  2/17/2021    Technique Used Grade  Level # Time(s) Effect while being performed                                                                 HEP Log Date 1. Reverse clam, pelvic floor in hooklying, box squat, piriformis stretch 2/17/2021   2.  2/17/2021   3. 2/17/2021   4.    5.           [employer] Portal  Treatment/Session Summary:    Response to Treatment: Pt demonstrated understanding of POC and initial HEP. No increase in pain or adverse reactions. Communication/Consultation:  POC, HEP, PT goals, Faxed initial evaluation to MD.   Equipment provided today: HEP Handout   Recommendations/Intent for next treatment session:   Next visit will focus on Core Stability Pain Science Education Dry Needling Hip strengthening soft tissue mobilization. Treatment Plan of Care Effective Dates: 2/17/2021 TO 5/18/2021 (90 days).   Frequency/Duration: 2 times a week for 90 Days       Total Treatment Billable Duration:   25  Rx plus Eval   PT Patient Time In/Time Out  Time In: 1405  Time Out: 1500  Kendall Winston PT    Future Appointments   Date Time Provider Kennedy Saldana   2/24/2021  1:45 PM Kevin Muhammad, PT [facility]   2/26/2021 11:15 AM [facility]   3/2/2021  2:15 PM Kevin Muhammad, [facility]   3/5/2021 11:15 AM Cyrillluna Muhammad, PT [facility]   3/8/2021  3:15 PM Cyrillluna Muhammad, PT [facility]   3/10/2021  1:45 PM Kayden Brooks, PT [facility]   3/15/2021  2:30 PM Kayden Brooks, PT [facility]   3/17/2021  1:45 PM Kayden Brooks, PT [facility]

## 2021-02-17 NOTE — THERAPY EVALUATION
E  : 1955 [place] / [place] / Product Type: Medicare /  
 [address] at [facility] [address], [address] [place] [address], [facility], [address] [place] [address] [phone]   [phone] OUTPATIENT PHYSICAL THERAPY:Initial Assessment 2021 Treatment Diagnosis:Pain in right hip (M25.551) Other bursitis of hip, right hip (M70.71) Pelvic and perineal pain (R10.2) Muscle weakness (generalized) (M62.81) Precautions/Allergies:  
Adhesive MD Orders: Eval and Treat  MEDICAL/REFERRING DIAGNOSIS: 
Other bursitis of hip, right hip [M70.71] DATE OF ONSET: Chronic 10 yrs plus REFERRING PHYSICIAN: Antonio Blevins MD 
RETURN PHYSICIAN APPOINTMENT: TBD by patient, MD to f/u with call INITIAL ASSESSMENT:   E  presents to physical therapy chronic right hip pain in the ischial tuberosity region that has been ongoing for approx 10 years per patient. Pt does report that approximately 10 to 12 years ago she had surgery to repair rectal and uterine prolapse. Pt reports she has been able to manage over the years with exercise, stretching, and acupuncture. Pt reports most recent flare up began in summer with worsening symptoms. Pt reports MD may give cortisone injection to assist with inflammation. Pt is anticipated to have a reduction in ischial region pain with strength training, stretching, and breathing techniques in order to improve roles and responsibilities in household and community. If pt does not show improvements over course of therapy intervention, she may benefit from examination and evaluation by a women's health PT. Harper Barraza will benefit from skilled physical therapy (medically necessary) to address above deficits affecting participation in basic ADLs and functional mobility/tolerance. Patient will benefit from manual therapeutic techniques (stretching, joint mobilizations, soft tissue mobilization/myofascial release), therapeutic exercises and activities, postural strengthening/education, and comprehensive home exercises program to address current impairments and functional limitations. PROBLEM LIST (Impacting functional limitations): 
· Decreased Strength · Decreased ADL/Functional Activities · Decreased Transfer Abilities · Decreased Ambulation Ability/Technique · Decreased Balance · Increased Pain · Decreased Activity Tolerance · Increased Fatigue · Increased Shortness of Breath · Decreased Flexibility/Joint Mobility · Decreased [place] with Home Exercise Program INTERVENTIONS PLANNED: 
1. Balance Exercise 2. Women's health interventions 3. Cold 4. Cryotherapy 5. Electrical Stimulation 6. Family Education 7. Gait Training 8. Heat 9. Home Exercise Program (HEP) 10. Manual Therapy 11. Neuromuscular Re-education/Strengthening 12. Range of Motion (ROM) 13. Therapeutic Activites 14. Therapeutic Exercise/Strengthening 15. Dry needling 16. Ultrasound (US)  
TREATMENT PLAN: 
Effective Dates: 2/17/21 TO 5/18/2021 (90 days). Frequency/Duration: 2 times a week for 90 Days GOALS: (Goals have been discussed and agreed upon with patient.) Short-Term Goals: 45 days  Goal Met 1. Harper Barraza will be independent with HEP to maintain functional gains made with therapy intervention. 1.  [] Date:  
2. Harper Barraza will be able to walk x 10 min on the treadmill with no more than 2/10 pain in order to complete grocery shopping. 2.  [] Date: [address] Alyciay will increase hip IR strength to 4-/5 to improve hip stability during, bending, lifting, stooping, and squatting. 3.  [] Date:  
4. Anson Bonilla will be able to perform 17 sit to stand transfers from standard height chair without UE support in order to improve strength and community access at restaurants. 4.  [] Date:  
5. Anson Bonilla will ascend/descend 10 in box step in unilateral support to improve ability to navigate stairs in household and community without pain in right hip. 5. [] Date:  
    
 Long Term Goals: 90 days Goal Met 1. Riapineda Pace to increase lower extremity functional scale by 20 points to show improvement in household and community mobility. 1.  [] Date:  
2. Anson Bonilla will demonstrate 4/5 hip abductor strength on manual muscle testing to promote stance limb control with gait and stair negotiation to prevent low back shear. 2.  [] Date: 3. Anson Bonilla will demonstrate appropriate lifting technique from floor to waist level with 90 lbs and no cues from therapist to complete household and community roles and responsibilities 3. [] Date:  
4. Anson Bonilla will participate in walking program on treadmill x >=15 min with <= 0/10 pain to navigate community settings. 4.  [] Date: CLINICAL DECISION MAKING:  
Outcome Measure: Tool Used: Lower Extremity Functional Scale (LEFS) Score:  Initial: 49/80 Most Recent: X/80 (Date: -- ) Interpretation of Score: 20 questions each scored on a 5 point scale with 0 representing \"extreme difficulty or unable to perform\" and 4 representing \"no difficulty\". The lower the score, the greater the functional disability. 80/80 represents no disability. Minimal detectable change is 9 points. Tool Used: 30 sec, 18 in chair, no UE support Score:  Initial: 11 reps Most Recent: X (Date: -- ) Rehabilitation Potential For Stated Goals: Good Medical Necessity: · Skilled intervention continues to be required due to deficits and impairments seen upon initial evaluation affecting patient's participation in ADLs and functional tasks. Reason for Services/Other Comments: 
· Patient continues to require skilled intervention due to deficits and impairments seen upon initial evaluation affecting patient's participation in ADLs and functional tasks. Total Treatment Duration: 30 min plus treatment PT Patient Time In/Time Out Time In: 8236 Time Out: 1500 Regarding Abigail Pace's therapy, I certify that the treatment plan above will be carried out by a therapist or under their direction. Thank you for this referral, Maureen Loera, PT Referring Physician Signature: Precious Erwin MD              Date HISTORY:  
History of Present Injury/Illness (Reason for Referral): Chronic history of right sided hip pain that she has been able to manage with therapy, exercises, and stretching. Pt does report feelings of weakness in the right leg Pt does  yoga on a daily basis for past year. Pt reports a more recent flare up since COVD. Pt reports interruption in sleep. Pain pigeon pose and prolonged walking >10 min. Pt had approx 10 year ago had uterine and rectal prolapse. Pt does report that she does have occassional leakage. Pt reports sister was just dx with RA. Dominant Side:  
      RIGHT Pain Scale on day of evaluation: · Current: 4-5/10 · Worst: 8/10 Prior Level of Function/Work/Activity: 
Current level of function: walking greater than 10 min, sitting for long periods, difficulty climbing stairs, interrupted sleep 5 hours of sleep on average Prior level of function: chronic pain that comes and goes Work/hobbies: yoga, walking Other Clinical Tests:   
      Imaging: YES, radiograph Previous Treatment Approaches:   
      None in past year Social History/Living Environment: Pt lives in a one level home with family Past Medical History/Comorbidities: Ms. Daren Harman  has a past medical history of Arthritis, Cancer ([address] and Hypertension. She also has no past medical history of Aneurysm ([address] Arrhythmia, Asthma, Autoimmune disease ([address] CAD (coronary artery disease), Chronic kidney disease, Chronic pain, Coagulation defects, COPD, Diabetes ([address] GERD (gastroesophageal reflux disease), Heart failure ([address] Liver disease, PUD (peptic ulcer disease), Seizures ([address] Stroke ([address] Thromboembolus ([address] or Thyroid disease. Ms. Daren Harman  has a past surgical history that includes hx bunionectomy; hx tubal ligation; hx sg and bso (2006); and hx breast reduction. R shoulder surgery due to arthritis. Ambulatory/Rehab Services H2 Model Falls Risk Assessment Risk Factors: 
     No Risk Factors Identified Ability to Rise from Chair: 
     (0)  Ability to rise in a single movement Falls Prevention Plan: No modifications necessary Total: (5 or greater = High Risk): 0  
 ©2010 [facility] [address] [place] [address] States Patent #[id number]. Federal Law prohibits the replication, distribution or use without written permission from [facility] [employer] Current Medications:   
  
Current Outpatient Medications:  
  losartan 50 mg tab 100 mg, hydroCHLOROthiazide 25 mg tab 25 mg, Take 1 Dose by mouth daily. , Disp: , Rfl:  
  amLODIPine (NORVASC) 10 mg tablet, Take 10 mg by mouth daily. , Disp: , Rfl:  
  estradioL (VAGIFEM) 10 mcg tab vaginal tablet, Insert 10 mcg into vagina. Twice weekly, Disp: , Rfl:  
  calcium carb/vit D3/minerals (CALCIUM-VITAMIN D PO), Take 1 Tab by mouth daily. , Disp: , Rfl:  
  BIOTIN PO, Take 1 Tab by mouth daily. , Disp: , Rfl:  
  glucosamine/chondr castañeda A sod (OSTEO BI-FLEX PO), Take 1 Tab by mouth daily. , Disp: , Rfl:  
  multivitamin capsule, Take 1 Cap by mouth daily. , Disp: , Rfl:  
 Zelalem Rea,, daily. Tumeric/geoff , Disp: , Rfl:   
Date Last Reviewed:  2/17/2021 Number of Personal Factors/Comorbidities that affect the Plan of Care: 0: LOW COMPLEXITY EXAMINATION:  
Observation/Orthostatic Postural Assessment:   
· Gait= trendelenburg pattern, wide TERRY · Pt with minimal palpable pelvic floor recruitment with overriding gluteal contraction noted Functional Mobility:  Affecting participation in ambulation and standing · Squat with downward reach:able to complete · Stair Negotiation: able to complete with increased discomfort ROM: Assessed @ Initial Visit: 
AROM/PROM Joint: Eval Date:  2/17/21  Re-Assess Date:  Re-Assess Date: Active ROM RIGHT LEFT RIGHT LEFT RIGHT LEFT Knee Extension 0 0 Knee Flexion 125 125 Hip Extension 8 8 Hip Abduction NT NT Hip IR/ER 8/40 dg 10dg/ 35 dg      
Dorsiflexion NT NT Strength:   
 Eval Date:2/17/21  Re-Assess Date:  Re-Assess Date:   
  RIGHT LEFT RIGHT LEFT RIGHT LEFT Knee Flexion 4-/5 4-/5 Knee Extension 5/5 5/5 Hip Flexion 4-/5 4-/5 Hip Abduction 3+/5 3+/5 Hip IR 3/5 3/5 Dorsiflexion 4/5 4/5 Hip ER 3+/5 3+/5 Special Tests: Assessed @ Initial Visit · Lillie's Test - negative · Scouring Test - NT 
· IVIS - negative · SLR - negative Neurological Screen: Patient demonstrates/reports no loss in sensation Body Structures Involved: 1. Nerves 2. Bones 3. Joints 4. Muscles 5. Ligaments Body Functions Affected: 1. Sensory/Pain 2. Reproductive 3. Neuromusculoskeletal 
4. Movement Related Activities and Participation Affected: 1. Mobility 2. Self Care Number of elements that affect the Plan of Care: 1-2: LOW COMPLEXITY CLINICAL PRESENTATION:  
Presentation: Stable and uncomplicated: LOW COMPLEXITY Use of outcome tool(s) and clinical judgement create a POC that gives a: Clear prediction of patient's progress: LOW COMPLEXITY

## 2021-02-24 ENCOUNTER — HOSPITAL ENCOUNTER (OUTPATIENT)
Dept: PHYSICAL THERAPY | Age: 66
Discharge: HOME | End: 2021-02-24
Payer: MEDICARE

## 2021-02-24 PROCEDURE — 97530 THERAPEUTIC ACTIVITIES: CPT

## 2021-02-24 PROCEDURE — 97110 THERAPEUTIC EXERCISES: CPT

## 2021-02-24 NOTE — PROGRESS NOTES
Michellel Shoulder Lex  : 1955  [place] / Product Type: Medicare /  [address] [address] at [address]. [facility], [address], [facility], [address] [place] [address] Road  [phone]   [phone]                                         OUTPATIENT PHYSICAL THERAPY: Daily Treatment Note 2021 Visit Count:  2    Treatment Diagnosis:Pain in right hip (M25.551)  Other bursitis of hip, right hip (M70.71)  Pelvic and perineal pain (R10.2)  Muscle weakness (generalized) (M62.81)  Precautions/Allergies:   Adhesive   MD Orders: Eval and Treat  MEDICAL/REFERRING DIAGNOSIS:  Other bursitis of hip, right hip [M70.71]  DATE OF ONSET: Chronic 10 yrs plus  REFERRING PHYSICIAN: Mayela Christianson MD  RETURN PHYSICIAN APPOINTMENT: TBD by patient, MD to f/u with call        Pre-treatment Symptoms/Complaints: The box squat exercise really flared some things up. My arthritis is driving me insane. I have not been able to sleep   Pain: Initial: Does not rate/10  Medications Last Reviewed:  2021     Post Session: Does not rate/10   Updated Objective Findings: None today        TREATMENT:   THERAPEUTIC EXERCISE: ( 15 minutes):  Exercises per grid below to improve mobility, strength and balance. Required minimal visual, verbal and manual cues to promote proper body alignment and promote proper body posture. Progressed resistance and complexity of movement as indicated. Date:  2021 Date:  21 Date:     Activity/Exercise Parameters Parameters Parameters   Education HEP, POC, PT goals, anatomy/pathology     Hook lying Pelvic Floor 5 min with breath instruction     Reverse Clam 10x     Piriformis stretch Supine and seated  2 min     Box Squat 22 in  10x 22 in  3 x 8 min      Sci Fit Stepper  Seat 5, lvl 2, 5 min                THERAPEUTIC ACTIVITY: ( 30 minutes):  Activities per gid below to improve functional movement related mobility, strength and balance to improve neuro-muscular carryover to daily functional activities for improving patient's quality of life. Required visual, verbal and manual cues to promote proper body alignment and promote proper body posture/mechanics. Progressed resistance and complexity of movement as indicated. Date:  2/24/2021 Date:   Date:     Activity/Exercise Parameters Parameters Parameters   Resisted Side Stepping Red band  3 x 12 ft       Cat/camel 10x       child's pose <-> fwd rocking 10 x       Deadlift Dowel  2 x 8 reps  10 lb - 2 x 5 reps                                           MANUAL THERAPY: (0 minutes): Joint mobilization, Soft tissue mobilization was utilized and necessary because of the patient's restricted joint motion and restricted motion of soft tissue mobility. Date  2/24/2021    Technique Used Grade  Level # Time(s) Effect while being performed                                                                 HEP Log Date 1. Reverse clam, pelvic floor in hooklying, box squat, piriformis stretch 2/17/2021   2. \"How to Dead lift\" with dowel at home 2/24/2021   3. 2/24/2021   4.    5.           [email or web address] Portal  Treatment/Session Summary:    Response to Treatment: Pt requires cues to increase hip flexion and posterior chain recruitment and rigidity in thoracic spine. Pt responds well to tactile and verbal cues for technique, however not progressed in heavier weigh today due to inconsistency with technique. Pt treatment sessions to focus on developing strengthening program for gym in conjunction with yoga and walking routine. Communication/Consultation:  None today. Equipment provided today: HEP Handout   Recommendations/Intent for next treatment session:   Next visit will focus on Core Stability Pain Science Education Dry Needling Hip strengthening soft tissue mobilization. Treatment Plan of Care Effective Dates: 2/17/2021 TO 5/18/2021 (90 days).   Frequency/Duration: 2 times a week for 90 Days       Total Treatment Billable Duration:  45 Rx   PT Patient Time In/Time Out  Time In: 5768  Time Out: [address], PT    Future Appointments   Date Time Provider Kennedy Saldana   2/26/2021 11:15 AM Mila Camps, PT [facility]   3/2/2021  1:45 PM Mila Camps, PT [facility]   3/5/2021 11:15 AM Mila Camps, PT [facility]   3/8/2021  3:15 PM Mila Camps, PT [facility]   3/10/2021  1:45 PM Mila Camps, PT [facility]   3/15/2021  2:30 PM Mila Camps, PT [facility]   3/17/2021  1:45 PM Mila Camps, PT [facility]

## 2021-02-26 ENCOUNTER — HOSPITAL ENCOUNTER (OUTPATIENT)
Dept: PHYSICAL THERAPY | Age: 66
Discharge: HOME | End: 2021-02-26
Payer: MEDICARE

## 2021-02-26 PROCEDURE — 97530 THERAPEUTIC ACTIVITIES: CPT

## 2021-02-26 PROCEDURE — 97110 THERAPEUTIC EXERCISES: CPT

## 2021-02-26 NOTE — PROGRESS NOTES
Radha Burnett  : 1955  [place] / Product Type: Medicare /  [employer] [address] [address] [place]. [facility], [address], [facility], [address] [place] [address]  [phone]   [phone]                                         OUTPATIENT PHYSICAL THERAPY: Daily Treatment Note 2021 Visit Count:  3    Treatment Diagnosis:Pain in right hip (M25.551)  Other bursitis of hip, right hip (M70.71)  Pelvic and perineal pain (R10.2)  Muscle weakness (generalized) (M62.81)  Precautions/Allergies:   Adhesive   MD Orders: Eval and Treat  MEDICAL/REFERRING DIAGNOSIS:  Other bursitis of hip, right hip [M70.71]  DATE OF ONSET: Chronic 10 yrs plus  REFERRING PHYSICIAN: Sarah Guillen MD  RETURN PHYSICIAN APPOINTMENT: TBD by patient, MD to f/u with call        Pre-treatment Symptoms/Complaints: \"I did yoga yesterday. I had to back of on some stretching, but nothing really flared up. I was not too sore after last time. \"   Pain: Initial: Does not rate/10  Medications Last Reviewed:  2021     Post Session: Does not rate/10   Updated Objective Findings: None today        TREATMENT:   THERAPEUTIC EXERCISE: ( 15 minutes):  Exercises per grid below to improve mobility, strength and balance. Required minimal visual, verbal and manual cues to promote proper body alignment and promote proper body posture. Progressed resistance and complexity of movement as indicated. Date:  2021 Date:  21 Date:  21   Activity/Exercise Parameters Parameters Parameters   Education HEP, POC, PT goals, anatomy/pathology     Hook lying Pelvic Floor 5 min with breath instruction     Reverse Clam 10x     Piriformis stretch Supine and seated  2 min     Box Squat 22 in  10x 22 in  3 x 8 min   22 in, dowel  3 x 8 reos   Sci Fit Stepper  Seat 5, lvl 2, 5 min 10 min, lvl 5   Hip hinge   With dowel  2 x 10 reps         THERAPEUTIC ACTIVITY: ( 30 minutes):  Activities per gid below to improve functional movement related mobility, strength and balance to improve neuro-muscular carryover to daily functional activities for improving patient's quality of life. Required visual, verbal and manual cues to promote proper body alignment and promote proper body posture/mechanics. Progressed resistance and complexity of movement as indicated. Date:  2/24/2021 Date:  2/26/21 Date:     Activity/Exercise Parameters Parameters Parameters   Resisted Side Stepping Red band  3 x 12 ft Red band  3 x 12 ft     Cat/camel 10x       child's pose <-> fwd rocking 10 x       Deadlift Dowel  2 x 8 reps  10 lb - 2 x 5 reps Floor - Dowel - 15x  10 lb - 5 x  RDL -   Dowel - 10 x  10 lb 5x   20 lb 5x     Monster walk   Red band  3 x 12 ft                               MANUAL THERAPY: (0 minutes): Joint mobilization, Soft tissue mobilization was utilized and necessary because of the patient's restricted joint motion and restricted motion of soft tissue mobility. Date  2/26/2021    Technique Used Grade  Level # Time(s) Effect while being performed                                                                 HEP Log Date 1. Reverse clam, pelvic floor in hooklying, box squat, piriformis stretch 2/17/2021   2. \"How to Dead lift\" with dowel at home 2/24/2021   3. 2/26/2021   4.    5.           [facility] Portal  Treatment/Session Summary:    Response to Treatment: Pt reverted to Qatari dead lift due to inconsistency with deadlift technique with floor. Pt requires max cues for spinal positioning and positioning relative to the bar. Communication/Consultation:  None today. Equipment provided today: Pt provided with educational information on arthritis and exercise. Recommendations/Intent for next treatment session:   Next visit will focus on Core Stability Pain Science Education Dry Needling Hip strengthening soft tissue mobilization.          Treatment Plan of Care Effective Dates: 2/17/2021 TO 5/18/2021 (90 days).   Frequency/Duration: 2 times a week for 90 Days       Total Treatment Billable Duration:  45 Rx   PT Patient Time In/Time Out  Time In: 1115  Time Out: 601 Merari Reeves, PT    Future Appointments   Date Time Provider Kennedy Saldana   3/2/2021  1:45 PM Taisha Ates, PT [facility] AND [place] [facility]   3/5/2021 11:15 AM Taisha Ates, PT [facility]   3/8/2021  3:15 PM Taisha Ates, PT [facility]   3/10/2021  1:45 PM Taisha Ates, PT [facility]   3/15/2021  2:30 PM Taisha Ates, PT [facility]   3/17/2021  1:45 PM Taisha Ates, PT [facility]

## 2021-03-02 ENCOUNTER — HOSPITAL ENCOUNTER (OUTPATIENT)
Dept: PHYSICAL THERAPY | Age: 66
Discharge: HOME | End: 2021-03-02
Payer: MEDICARE

## 2021-03-02 PROCEDURE — 97530 THERAPEUTIC ACTIVITIES: CPT

## 2021-03-02 PROCEDURE — 97110 THERAPEUTIC EXERCISES: CPT

## 2021-03-02 NOTE — PROGRESS NOTES
Memo Burnett  : 1955  [place] / Product Type: Medicare /  [employer] [address] [address] [place]. [facility], [address], [facility], [id number] [place] [address]  [phone]   [phone]                                         OUTPATIENT PHYSICAL THERAPY: Daily Treatment Note 3/2/2021 Visit Count:  4    Treatment Diagnosis:Pain in right hip (M25.551)  Other bursitis of hip, right hip (M70.71)  Pelvic and perineal pain (R10.2)  Muscle weakness (generalized) (M62.81)  Precautions/Allergies:   Adhesive   MD Orders: Eval and Treat  MEDICAL/REFERRING DIAGNOSIS:  Other bursitis of hip, right hip [M70.71]  DATE OF ONSET: Chronic 10 yrs plus  REFERRING PHYSICIAN: Eamon Espinosa MD  RETURN PHYSICIAN APPOINTMENT: TBD by patient, MD to f/u with call        Pre-treatment Symptoms/Complaints: \"I went to the gym and did some lifting since I did not get into the yoga class. \"   Pain: Initial: Does not rate/10  Medications Last Reviewed:  3/2/2021     Post Session: Does not rate/10   Updated Objective Findings: None today        TREATMENT:   THERAPEUTIC EXERCISE: ( 25 minutes):  Exercises per grid below to improve mobility, strength and balance. Required minimal visual, verbal and manual cues to promote proper body alignment and promote proper body posture. Progressed resistance and complexity of movement as indicated.      Date:  2021 Date:  21 Date:  21 Date:  3/2/21   Activity/Exercise Parameters Parameters Parameters    Education HEP, POC, PT goals, anatomy/pathology      Hook lying Pelvic Floor 5 min with breath instruction      Reverse Clam 10x      Piriformis stretch Supine and seated  2 min      Box Squat 22 in  10x 22 in  3 x 8 min   22 in, dowel  3 x 8 reos 20 in, dowel  3 x 8 reps  Progressed to red band around knees 10x   Sci Fit Stepper  Seat 5, lvl 2, 5 min 10 min, lvl 5 lvl 5 293 step, 8 min   Hip hinge   With dowel  2 x 10 reps Dowel 20x   Ring Flexion    3 min         THERAPEUTIC ACTIVITY: ( 15 minutes): Activities per gid below to improve functional movement related mobility, strength and balance to improve neuro-muscular carryover to daily functional activities for improving patient's quality of life. Required visual, verbal and manual cues to promote proper body alignment and promote proper body posture/mechanics. Progressed resistance and complexity of movement as indicated. Date:  2/24/2021 Date:  2/26/21 Date:  3/2/21   Activity/Exercise Parameters Parameters Parameters   Resisted Side Stepping Red band  3 x 12 ft Red band  3 x 12 ft Red band  3 x 12 ft   Cat/camel 10x       child's pose <-> fwd rocking 10 x       Deadlift Dowel  2 x 8 reps  10 lb - 2 x 5 reps Floor - Dowel - 15x  10 lb - 5 x  RDL -   Dowel - 10 x  10 lb 5x   20 lb 5x     Monster walk   Red band  3 x 12 ft Red band  3 x 12 ft   Goblet Squat     10 lb  2 x 8 reps                   MANUAL THERAPY: (0 minutes): Joint mobilization, Soft tissue mobilization was utilized and necessary because of the patient's restricted joint motion and restricted motion of soft tissue mobility. Date  3/2/2021    Technique Used Grade  Level # Time(s) Effect while being performed                                                                 HEP Log Date 1. Reverse clam, pelvic floor in hooklying, box squat, piriformis stretch 2/17/2021   2. \"How to Dead lift\" with dowel at home 2/24/2021   3. 3/2/2021   4.    5.           [facility] Portal  Treatment/Session Summary:    Response to Treatment: Pt responds well to use of tactile cues to improve body mechanics and positioning for squat position. Pt with increased shoulder tightness for low bar squat position with mild pain in bicep region and anterior shoulder that is improved with shoulder flexion stretch and re-position of  on dowel.    Communication/Consultation:  Pt with continued education on technique and provided with resources to practice technique of lifts. Equipment provided today: Pt provided with educational information on arthritis and exercise. Recommendations/Intent for next treatment session:   Next visit will focus on Core Stability Pain Science Education Dry Needling Hip strengthening soft tissue mobilization. Treatment Plan of Care Effective Dates: 2/17/2021 TO 5/18/2021 (90 days).   Frequency/Duration: 2 times a week for 90 Days       Total Treatment Billable Duration:  45 Rx   PT Patient Time In/Time Out  Time In: 1345  Time Out: [address] Janet Romo PT    Future Appointments   Date Time Provider Kennedy Saldana   3/5/2021 11:15 AM Gennette Pump, PT [facility]   3/8/2021  3:15 PM Gennette Pump, PT [facility]   3/10/2021  1:45 PM Gennette Pump, PT [facility]   3/15/2021  2:30 PM Gennette Pump, PT [facility]   3/17/2021  1:45 PM Gennette Pump, PT [facility]

## 2021-03-05 ENCOUNTER — HOSPITAL ENCOUNTER (OUTPATIENT)
Dept: PHYSICAL THERAPY | Age: 66
Discharge: HOME | End: 2021-03-05
Payer: MEDICARE

## 2021-03-05 PROCEDURE — 97530 THERAPEUTIC ACTIVITIES: CPT

## 2021-03-05 PROCEDURE — 97110 THERAPEUTIC EXERCISES: CPT

## 2021-03-05 NOTE — PROGRESS NOTES
Memo Burnett  : 1955  [place] / Product Type: Medicare /  Angela Joe at [address] [facility]. [facility], [address], [facility], [address] [place] [address]  [phone]   [phone]                                         OUTPATIENT PHYSICAL THERAPY: Daily Treatment Note 3/5/2021 Visit Count:  5    Treatment Diagnosis:Pain in right hip (M25.551)  Other bursitis of hip, right hip (M70.71)  Pelvic and perineal pain (R10.2)  Muscle weakness (generalized) (M62.81)  Precautions/Allergies:   Adhesive   MD Orders: Eval and Treat  MEDICAL/REFERRING DIAGNOSIS:  Other bursitis of hip, right hip [M70.71]  DATE OF ONSET: Chronic 10 yrs plus  REFERRING PHYSICIAN: Eamon Espinosa MD  RETURN PHYSICIAN APPOINTMENT: TBD by patient, MD to f/u with call        Pre-treatment Symptoms/Complaints: \"I did yoga yesterday. My joints are sore. I still have that pain right in my butt. \"   Pain: Initial: Does not rate/10  Medications Last Reviewed:  3/5/2021     Post Session: Does not rate/10   Updated Objective Findings: None today        TREATMENT:   THERAPEUTIC EXERCISE: ( 10 minutes):  Exercises per grid below to improve mobility, strength and balance. Required minimal visual, verbal and manual cues to promote proper body alignment and promote proper body posture. Progressed resistance and complexity of movement as indicated.      Date:  2021 Date:  21 Date:  21 Date:  3/2/21 Date:  3/5/21   Activity/Exercise Parameters Parameters Parameters     Education HEP, POC, PT goals, anatomy/pathology       Hook lying Pelvic Floor 5 min with breath instruction       Reverse Clam 10x       Piriformis stretch Supine and seated  2 min       Box Squat 22 in  10x 22 in  3 x 8 min   22 in, dowel  3 x 8 reos 20 in, dowel  3 x 8 reps  Progressed to red band around knees 10x 20 in, dowel  3 x 8 reps   Sci Fit Stepper  Seat 5, lvl 2, 5 min 10 min, lvl 5 lvl 5 293 step, 8 min 5 min, lvl 5 for warm up   Hip hinge   With dowel  2 x 10 reps Dowel 20x    Ring Flexion    3 min          THERAPEUTIC ACTIVITY: ( 30 minutes): Activities per gid below to improve functional movement related mobility, strength and balance to improve neuro-muscular carryover to daily functional activities for improving patient's quality of life. Required visual, verbal and manual cues to promote proper body alignment and promote proper body posture/mechanics. Progressed resistance and complexity of movement as indicated. Date:  2/26/21 Date:  3/2/21 Date:  3/5/21   Activity/Exercise Parameters Parameters    Resisted Side Stepping Red band  3 x 12 ft Red band  3 x 12 ft Red band  3 x 12 ft   Deadlift Floor - Dowel - 15x  10 lb - 5 x  RDL -   Dowel - 10 x  10 lb 5x   20 lb 5x      Monster walk Red band  3 x 12 ft Red band  3 x 12 ft Red band  3 x 12 ft  Fwd/backward   Goblet Squat   10 lb  2 x 8 reps    Rack pull     10 lb - 5x  20 lb 3 x 5 reps   Low bar Squat     10 lb - 10 x  20 lb 3 x 5 reps                HEP Log Date 1. Reverse clam, pelvic floor in hooklying, box squat, piriformis stretch 2/17/2021   2. \"How to Dead lift\" with dowel at home 2/24/2021   3. 3/5/2021   4.    5.           [facility] Portal  Treatment/Session Summary:    Response to Treatment: Pt responds well to rack pull for hip and back coordination required for dead lift. Pt to benefit from continued box squats with incremental lowering as pt unable to attain parallel with low bar squat and requires frequent cues for positioning. Communication/Consultation:  None today   Equipment provided today: None today   Recommendations/Intent for next treatment session:   Next visit will focus on Core Stability Pain Science Education Dry Needling Hip strengthening soft tissue mobilization. Treatment Plan of Care Effective Dates: 2/17/2021 TO 5/18/2021 (90 days).   Frequency/Duration: 2 times a week for 90 Days       Total Treatment Billable Duration:  40 Rx   PT Patient Time In/Time Out  Time In: 1110  Time Out: [address] [facility], PT    Future Appointments   Date Time Provider Kennedy Saldana   3/8/2021  3:15 PM Kayden Brooks, PT [facility] AND [facility]   3/10/2021  1:45 PM Kayden Brooks, PT [facility]   3/15/2021  2:30 PM Kayden Brooks, PT [facility]   3/17/2021  1:45 PM Kayden Brooks PT [facility]

## 2021-03-08 ENCOUNTER — HOSPITAL ENCOUNTER (OUTPATIENT)
Dept: PHYSICAL THERAPY | Age: 66
Discharge: HOME | End: 2021-03-08
Payer: MEDICARE

## 2021-03-08 PROCEDURE — 97530 THERAPEUTIC ACTIVITIES: CPT

## 2021-03-08 PROCEDURE — 97110 THERAPEUTIC EXERCISES: CPT

## 2021-03-08 NOTE — PROGRESS NOTES
Abigail Burnett  : 1955  [place]: [place] / Product Type: Medicare /  Lottie Jimenez at [address] [place] [address]. [facility], [address], [facility], [address] [place] [address]  [phone]   [phone]                                         OUTPATIENT PHYSICAL THERAPY: Daily Treatment Note 3/8/2021 Visit Count:  6    Treatment Diagnosis:Pain in right hip (M25.551)  Other bursitis of hip, right hip (M70.71)  Pelvic and perineal pain (R10.2)  Muscle weakness (generalized) (M62.81)  Precautions/Allergies:   Adhesive   MD Orders: Eval and Treat  MEDICAL/REFERRING DIAGNOSIS:  Other bursitis of hip, right hip [M70.71]  DATE OF ONSET: Chronic 10 yrs plus  REFERRING PHYSICIAN: Precious Erwin MD  RETURN PHYSICIAN APPOINTMENT: TBD by patient, MD to f/u with call        Pre-treatment Symptoms/Complaints: \"I was unable to sit for an entire dinner because my spot started hurting like a deep tooth ache\"   Pain: Initial: Does not rate/10  Medications Last Reviewed:  3/8/2021     Post Session: Does not rate/10   Updated Objective Findings: None today        TREATMENT:   THERAPEUTIC EXERCISE: ( 20 minutes):  Exercises per grid below to improve mobility, strength and balance. Required minimal visual, verbal and manual cues to promote proper body alignment and promote proper body posture. Progressed resistance and complexity of movement as indicated.      Date:  2021 Date:  21 Date:  21 Date:  3/2/21 Date:  3/5/21 Date:  3/8/21   Activity/Exercise Parameters Parameters Parameters      Education HEP, POC, PT goals, anatomy/pathology        Hook lying Pelvic Floor 5 min with breath instruction        Reverse Clam 10x        Piriformis stretch Supine and seated  2 min        Box Squat 22 in  10x 22 in  3 x 8 min   22 in, dowel  3 x 8 reos 20 in, dowel  3 x 8 reps  Progressed to red band around knees 10x 20 in, dowel  3 x 8 reps    Sci Fit Stepper  Seat 5, lvl 2, 5 min 10 min, lvl 5 lvl 5 293 step, 8 min 5 min, lvl 5 for warm up 6 min, lvl 5 for warm up   Hip hinge   With dowel  2 x 10 reps Dowel 20x     Ring Flexion    3 min     Chair Scoots      4 x 25 ft   Shuttle      DL  75 lb  3 x 8 reps         THERAPEUTIC ACTIVITY: ( 25 minutes): Activities per gid below to improve functional movement related mobility, strength and balance to improve neuro-muscular carryover to daily functional activities for improving patient's quality of life. Required visual, verbal and manual cues to promote proper body alignment and promote proper body posture/mechanics. Progressed resistance and complexity of movement as indicated. Date:  2/26/21 Date:  3/2/21 Date:  3/5/21 Date:  3/8/21   Activity/Exercise Parameters Parameters     Resisted Side Stepping Red band  3 x 12 ft Red band  3 x 12 ft Red band  3 x 12 ft Red band  3 x 12 ft   Deadlift Floor - Dowel - 15x  10 lb - 5 x  RDL -   Dowel - 10 x  10 lb 5x   20 lb 5x       Monster walk Red band  3 x 12 ft Red band  3 x 12 ft Red band  3 x 12 ft  Fwd/backward Red band  3 x 12 ft  Fwd/backward   Goblet Squat   10 lb  2 x 8 reps     Rack pull     10 lb - 5x  20 lb 3 x 5 reps    Low bar Squat     10 lb - 10 x  20 lb 3 x 5 reps    Good Morning      3 x 8 reps  15 lb KB   Single Leg Deadlift      10 lb  10 in black  Left/right  3 x 8 reps                HEP Log Date 1. Reverse clam, pelvic floor in hooklying, box squat, piriformis stretch 2/17/2021   2. \"How to Dead lift\" with dowel at home 2/24/2021   3. 3/8/2021   4.    5.           [employer] Portal  Treatment/Session Summary:    Response to Treatment: Pt requires cues to maintain a rigid back during good mornings and single leg dead lifts. Pt responds positively to use of shuttle reporting a relief in ischial tuberosity region discomfort.    Communication/Consultation:  None today   Equipment provided today: Therapist encourages pt to use leg press and hamstring curl machine Recommendations/Intent for next treatment session:   Next visit will focus on Core Stability Pain Science Education Dry Needling Hip strengthening soft tissue mobilization. Treatment Plan of Care Effective Dates: 2/17/2021 TO 5/18/2021 (90 days).   Frequency/Duration: 2 times a week for 90 Days       Total Treatment Billable Duration:  45 Rx   PT Patient Time In/Time Out  Time In: 1515  Time Out: 1600  Flint Needle, PT    Future Appointments   Date Time Provider Kennedy Saldana   3/10/2021  1:45 PM Ezio Melchor, PT [facility]   3/15/2021  2:30 PM Ezio Melchor, PT [facility]   3/17/2021  1:45 PM Ezio Melchor, PT [facility]

## 2021-03-10 ENCOUNTER — HOSPITAL ENCOUNTER (OUTPATIENT)
Dept: PHYSICAL THERAPY | Age: 66
Discharge: HOME | End: 2021-03-10
Payer: MEDICARE

## 2021-03-10 PROCEDURE — 97530 THERAPEUTIC ACTIVITIES: CPT

## 2021-03-10 PROCEDURE — 97110 THERAPEUTIC EXERCISES: CPT

## 2021-03-10 NOTE — PROGRESS NOTES
Minh Burnett  : 1955  [place] / Product Type: Medicare /  Leanne Tai at [address] [place] [address]. [facility], [address], [facility], [address] [place] [address]  [phone]   [phone]                                         OUTPATIENT PHYSICAL THERAPY: Daily Treatment Note 3/10/2021 Visit Count:  7    Treatment Diagnosis:Pain in right hip (M25.551)  Other bursitis of hip, right hip (M70.71)  Pelvic and perineal pain (R10.2)  Muscle weakness (generalized) (M62.81)  Precautions/Allergies:   Adhesive   MD Orders: Eval and Treat  MEDICAL/REFERRING DIAGNOSIS:  Other bursitis of hip, right hip [M70.71]  DATE OF ONSET: Chronic 10 yrs plus  REFERRING PHYSICIAN: Ulysses Lezama MD  RETURN PHYSICIAN APPOINTMENT: TBD by patient, MD to f/u with call        Pre-treatment Symptoms/Complaints: \"I think that shuttle helped. I don't have as much pain. \"   Pain: Initial: Does not rate/10  Medications Last Reviewed:  3/10/2021     Post Session: Does not rate/10   Updated Objective Findings: None today        TREATMENT:   THERAPEUTIC EXERCISE: ( 25 minutes):  Exercises per grid below to improve mobility, strength and balance. Required minimal visual, verbal and manual cues to promote proper body alignment and promote proper body posture. Progressed resistance and complexity of movement as indicated.      Date:  2021 Date:  21 Date:  21 Date:  3/2/21 Date:  3/5/21 Date:  3/8/21 Date:  3/10/21   Activity/Exercise Parameters Parameters Parameters       Education HEP, POC, PT goals, anatomy/pathology         Hook lying Pelvic Floor 5 min with breath instruction         Reverse Clam 10x         Piriformis stretch Supine and seated  2 min         Box Squat 22 in  10x 22 in  3 x 8 min   22 in, dowel  3 x 8 reos 20 in, dowel  3 x 8 reps  Progressed to red band around knees 10x 20 in, dowel  3 x 8 reps     Sci Fit Stepper  Seat 5, lvl 2, 5 min 10 min, lvl 5 lvl 5 293 step, 8 min 5 min, lvl 5 for warm up 6 min, lvl 5 for warm up 6 min, lvl 7 for warm up   Hip hinge   With dowel  2 x 10 reps Dowel 20x      Ring Flexion    3 min      Chair Scoots      4 x 25 ft 4 x 25 ft   Shuttle      DL  75 lb  3 x 8 reps DL  75 lb  3 x 8 reps  SL, 50 lb  2 x 8 reps         THERAPEUTIC ACTIVITY: ( 20 minutes): Activities per gid below to improve functional movement related mobility, strength and balance to improve neuro-muscular carryover to daily functional activities for improving patient's quality of life. Required visual, verbal and manual cues to promote proper body alignment and promote proper body posture/mechanics. Progressed resistance and complexity of movement as indicated. Date:  2/26/21 Date:  3/2/21 Date:  3/5/21 Date:  3/8/21 Date:  3/10/21   Activity/Exercise Parameters Parameters      Resisted Side Stepping Red band  3 x 12 ft Red band  3 x 12 ft Red band  3 x 12 ft Red band  3 x 12 ft Purple Band  3 x 12 ft   Deadlift Floor - Dowel - 15x  10 lb - 5 x  RDL -   Dowel - 10 x  10 lb 5x   20 lb 5x     45 lb  3 x 5 reps   Monster walk Red band  3 x 12 ft Red band  3 x 12 ft Red band  3 x 12 ft  Fwd/backward Red band  3 x 12 ft  Fwd/backward Purple Band  3 x 12 ft   Goblet Squat   10 lb  2 x 8 reps      Rack pull     10 lb - 5x  20 lb 3 x 5 reps     Low bar Squat     10 lb - 10 x  20 lb 3 x 5 reps     Good Morning      3 x 8 reps  15 lb KB    Single Leg Deadlift      10 lb  10 in black  Left/right  3 x 8 reps                 HEP Log Date 1. Reverse clam, pelvic floor in hooklying, box squat, piriformis stretch 2/17/2021   2. \"How to Dead lift\" with dowel at home 2/24/2021   3. 3/10/2021   4.    5.           [facility] Portal  Treatment/Session Summary:    Response to Treatment: Pt with good spinal extension control during dead lift today, pt with tendency to allow bar to drift away from body in the lower half of the lift.  Pt to benefit from continued hamstring strengthening. Communication/Consultation:  None today   Equipment provided today: None today   Recommendations/Intent for next treatment session:   Next visit will focus on Core Stability Pain Science Education Dry Needling Hip strengthening soft tissue mobilization. Treatment Plan of Care Effective Dates: 2/17/2021 TO 5/18/2021 (90 days).   Frequency/Duration: 2 times a week for 90 Days       Total Treatment Billable Duration:  45 Rx   PT Patient Time In/Time Out  Time In: 1345  Time Out: Martha Mccray, PT    Future Appointments   Date Time Provider Kennedy Saldana   3/15/2021  2:30 PM Sanchez Goodman, PT [facility]   3/17/2021  1:45 PM Sanchez Goodman, PT [facility]

## 2021-03-15 ENCOUNTER — HOSPITAL ENCOUNTER (OUTPATIENT)
Dept: PHYSICAL THERAPY | Age: 66
Discharge: HOME | End: 2021-03-15
Payer: MEDICARE

## 2021-03-15 PROCEDURE — 97530 THERAPEUTIC ACTIVITIES: CPT

## 2021-03-15 PROCEDURE — 97110 THERAPEUTIC EXERCISES: CPT

## 2021-03-15 NOTE — PROGRESS NOTES
Shital Burnett  : 1955  [place] / [place] / Product Type: Medicare /  Diana Sammy at [address] [place] [address]. [facility], [address], [facility], [address] [place] [address]  [phone]   [phone]                                         OUTPATIENT PHYSICAL THERAPY: Daily Treatment Note 3/15/2021 Visit Count:  8    Treatment Diagnosis:Pain in right hip (M25.551)  Other bursitis of hip, right hip (M70.71)  Pelvic and perineal pain (R10.2)  Muscle weakness (generalized) (M62.81)  Precautions/Allergies:   Adhesive   MD Orders: Eval and Treat  MEDICAL/REFERRING DIAGNOSIS:  Other bursitis of hip, right hip [M70.71]  DATE OF ONSET: Chronic 10 yrs plus  REFERRING PHYSICIAN: Ritesh Lim MD  RETURN PHYSICIAN APPOINTMENT: TBD by patient, MD to f/u with call        Pre-treatment Symptoms/Complaints: That pain will just not go away   Pain: Initial: Does not rate/10  Medications Last Reviewed:  3/15/2021     Post Session: Does not rate/10   Updated Objective Findings: None today        TREATMENT:   THERAPEUTIC EXERCISE: ( 20 minutes):  Exercises per grid below to improve mobility, strength and balance. Required minimal visual, verbal and manual cues to promote proper body alignment and promote proper body posture. Progressed resistance and complexity of movement as indicated.      Date:  2021 Date:  21 Date:  21 Date:  3/2/21 Date:  3/5/21 Date:  3/8/21 Date:  3/10/21 Date:  3/15/21   Activity/Exercise Parameters Parameters Parameters        Education HEP, POC, PT goals, anatomy/pathology          Hook lying Pelvic Floor 5 min with breath instruction          Reverse Clam 10x          Piriformis stretch Supine and seated  2 min          Box Squat 22 in  10x 22 in  3 x 8 min   22 in, dowel  3 x 8 reos 20 in, dowel  3 x 8 reps  Progressed to red band around knees 10x 20 in, dowel  3 x 8 reps      Sci Fit Stepper  Seat 5, lvl 2, 5 min 10 min, lvl 5 lvl 5 293 step, 8 min 5 min, lvl 5 for warm up 6 min, lvl 5 for warm up 6 min, lvl 7 for warm up 6 min, lvl 7 for warm up   Hip hinge   With dowel  2 x 10 reps Dowel 20x       Ring Flexion    3 min       Chair Scoots      4 x 25 ft 4 x 25 ft 2 x 30 ft   Shuttle      DL  75 lb  3 x 8 reps DL  75 lb  3 x 8 reps  SL, 50 lb  2 x 8 reps DL  100 lb  3 x 8 reps  SL, 75 lb  2 x 8 reps         THERAPEUTIC ACTIVITY: ( 25 minutes): Activities per gid below to improve functional movement related mobility, strength and balance to improve neuro-muscular carryover to daily functional activities for improving patient's quality of life. Required visual, verbal and manual cues to promote proper body alignment and promote proper body posture/mechanics. Progressed resistance and complexity of movement as indicated. Date:  2/26/21 Date:  3/2/21 Date:  3/5/21 Date:  3/8/21 Date:  3/10/21 Date:  3/15/21   Activity/Exercise Parameters Parameters       Resisted Side Stepping Red band  3 x 12 ft Red band  3 x 12 ft Red band  3 x 12 ft Red band  3 x 12 ft Purple Band  3 x 12 ft Purple Band  3 x 12 ft   Deadlift Floor - Dowel - 15x  10 lb - 5 x  RDL -   Dowel - 10 x  10 lb 5x   20 lb 5x     45 lb  3 x 5 reps 45 lb  10x  50 lb  3 x 5 reps   Monster walk Red band  3 x 12 ft Red band  3 x 12 ft Red band  3 x 12 ft  Fwd/backward Red band  3 x 12 ft  Fwd/backward Purple Band  3 x 12 ft Purple Band  3 x 12 ft   Goblet Squat   10 lb  2 x 8 reps       Rack pull     10 lb - 5x  20 lb 3 x 5 reps      Low bar Squat     10 lb - 10 x  20 lb 3 x 5 reps   Dowel x 10 reps  Use of band around squat rack for depth  10x   Good Morning      3 x 8 reps  15 lb KB     Single Leg Deadlift      10 lb  10 in black  Left/right  3 x 8 reps                  HEP Log Date 1. Reverse clam, pelvic floor in hooklying, box squat, piriformis stretch 2/17/2021   2.  \"How to Dead lift\" with dowel at home 2/24/2021   3. 3/15/2021   4.    5.           [facility] Portal  Treatment/Session Summary:    Response to Treatment: Pt with increased B knee valgus with squat today and increased left sided hip abductor cramping. Pt responds well to use of band for tactile feedback for appropriate squat depth. Communication/Consultation:  None today   Equipment provided today: None today   Recommendations/Intent for next treatment session:   Next visit will focus on Core Stability Pain Science Education Dry Needling Hip strengthening soft tissue mobilization. Treatment Plan of Care Effective Dates: 2/17/2021 TO 5/18/2021 (90 days).   Frequency/Duration: 2 times a week for 90 Days       Total Treatment Billable Duration:  45 Rx   PT Patient Time In/Time Out  Time In: 1430  Time Out: 1303 Laura Romo PT    Future Appointments   Date Time Provider Kennedy Saldana   3/17/2021  1:45 PM Valeriano Zamarripa PT [facility] AND [facility]

## 2021-03-17 ENCOUNTER — HOSPITAL ENCOUNTER (OUTPATIENT)
Dept: PHYSICAL THERAPY | Age: 66
Discharge: HOME | End: 2021-03-17
Payer: MEDICARE

## 2021-03-17 PROCEDURE — 97110 THERAPEUTIC EXERCISES: CPT

## 2021-03-17 PROCEDURE — 97530 THERAPEUTIC ACTIVITIES: CPT

## 2021-03-17 NOTE — PROGRESS NOTES
Zuly Burnett  : 1955  [place] / Product Type: Medicare /  Papo Moore at [address] [place] [address]. [facility], [address], [facility], [address] [place] [address]  [phone]   [phone]                                         OUTPATIENT PHYSICAL THERAPY: Daily Treatment Note 3/17/2021 Visit Count:  9    Treatment Diagnosis:Pain in right hip (M25.551)  Other bursitis of hip, right hip (M70.71)  Pelvic and perineal pain (R10.2)  Muscle weakness (generalized) (M62.81)  Precautions/Allergies:   Adhesive   MD Orders: Eval and Treat  MEDICAL/REFERRING DIAGNOSIS:  Other bursitis of hip, right hip [M70.71]  DATE OF ONSET: Chronic 10 yrs plus  REFERRING PHYSICIAN: Lubna Vazquez MD  RETURN PHYSICIAN APPOINTMENT: TBD by patient, MD to f/u with call        Pre-treatment Symptoms/Complaints: I really aggravated my thigh last time when I was doing those squats. It is a lot better today, but it still bothers me. \"   Pain: Initial: Does not rate/10  Medications Last Reviewed:  3/17/2021     Post Session: Does not rate/10   Updated Objective Findings: None today        TREATMENT:   THERAPEUTIC EXERCISE: ( 20 minutes):  Exercises per grid below to improve mobility, strength and balance. Required minimal visual, verbal and manual cues to promote proper body alignment and promote proper body posture. Progressed resistance and complexity of movement as indicated.      Date:  3/2/21 Date:  3/5/21 Date:  3/8/21 Date:  3/10/21 Date:  3/15/21 Date:  3/17/21   Activity/Exercise         Box Squat 20 in, dowel  3 x 8 reps  Progressed to red band around knees 10x 20 in, dowel  3 x 8 reps       Sci Fit Stepper lvl 5 293 step, 8 min 5 min, lvl 5 for warm up 6 min, lvl 5 for warm up 6 min, lvl 7 for warm up 6 min, lvl 7 for warm up 6 min, lvl 7 for warm up   Hip hinge Dowel 20x     1min   Ring Flexion 3 min        Chair Scoots   4 x 25 ft 4 x 25 ft 2 x 30 ft    Shuttle DL  75 lb  3 x 8 reps DL  75 lb  3 x 8 reps  SL, 50 lb  2 x 8 reps DL  100 lb  3 x 8 reps  SL, 75 lb  2 x 8 reps DL  100 lb  3 x 8 reps  SL, 75 lb  2 x 8 reps         THERAPEUTIC ACTIVITY: (25 minutes): Activities per gid below to improve functional movement related mobility, strength and balance to improve neuro-muscular carryover to daily functional activities for improving patient's quality of life. Required visual, verbal and manual cues to promote proper body alignment and promote proper body posture/mechanics. Progressed resistance and complexity of movement as indicated. Date:  2/26/21 Date:  3/2/21 Date:  3/5/21 Date:  3/8/21 Date:  3/10/21 Date:  3/15/21 Date:  3/17/21   Activity/Exercise Parameters Parameters        Resisted Side Stepping Red band  3 x 12 ft Red band  3 x 12 ft Red band  3 x 12 ft Red band  3 x 12 ft Purple Band  3 x 12 ft Purple Band  3 x 12 ft Purple Band  3 x 12 ft   Deadlift Floor - Dowel - 15x  10 lb - 5 x  RDL -   Dowel - 10 x  10 lb 5x   20 lb 5x     45 lb  3 x 5 reps 45 lb  10x  50 lb  3 x 5 reps Dowel  X 10 reps  30 lb  3 x 5 reps   Monster walk Red band  3 x 12 ft Red band  3 x 12 ft Red band  3 x 12 ft  Fwd/backward Red band  3 x 12 ft  Fwd/backward Purple Band  3 x 12 ft Purple Band  3 x 12 ft Purple Band  3 x 12 ft   Goblet Squat   10 lb  2 x 8 reps        Rack pull     10 lb - 5x  20 lb 3 x 5 reps       Low bar Squat     10 lb - 10 x  20 lb 3 x 5 reps   Dowel x 10 reps  Use of band around squat rack for depth  10x    Good Morning      3 x 8 reps  15 lb KB      Single Leg Deadlift      10 lb  10 in black  Left/right  3 x 8 reps      Tall kneeling         15 lb  3 x 8 reps   Quadruped rocking         5 min   1/2 kneeling rocking         5 min                HEP Log Date 1. Reverse clam, pelvic floor in hooklying, box squat, piriformis stretch 2/17/2021   2.  \"How to Dead lift\" with dowel at home 2/24/2021   3. 3/17/2021   4.    5.           [facility] Portal  Treatment/Session Summary:    Response to Treatment: Pt with increased irritability today resulting in a re-focus to mobilizations with movement for pain management and a reduction in weight. Pt with improving deadlift mechanics as only requires min cues for scapular retraction. Communication/Consultation:  None today   Equipment provided today: None today   Recommendations/Intent for next treatment session:   Next visit will focus on Core Stability Pain Science Education Dry Needling Hip strengthening soft tissue mobilization. Treatment Plan of Care Effective Dates: 2/17/2021 TO 5/18/2021 (90 days).   Frequency/Duration: 2 times a week for 90 Days       Total Treatment Billable Duration:  45 Rx   PT Patient Time In/Time Out  Time In: 1345  Time Out: 373 E Janet Romo PT    Future Appointments   Date Time Provider Kennedy Saldana   4/7/2021  1:45 PM Ezio Melchor PT [facility] AND [facility]

## 2021-04-05 PROBLEM — M70.61 TROCHANTERIC BURSITIS OF RIGHT HIP: Status: ACTIVE | Noted: 2021-04-05

## 2021-05-03 NOTE — THERAPY DISCHARGE
[address] : 1955 [place] / Product Type: Medicare /  
 [address] [facility] at [facility] [address], [address] [place] [address], [address] [place] [address] [phone]   [phone] OUTPATIENT PHYSICAL THERAPY: Discontinuation Summary 5/3/2021 Treatment Diagnosis:Pain in right hip (M25.551) Other bursitis of hip, right hip (M70.71) Pelvic and perineal pain (R10.2) Muscle weakness (generalized) (M62.81) Precautions/Allergies:  
Adhesive MD Orders: Eval and Treat  MEDICAL/REFERRING DIAGNOSIS: 
Other bursitis of hip, right hip [M70.71] DATE OF ONSET: Chronic 10 yrs plus REFERRING PHYSICIAN: Jay Ellison MD 
RETURN PHYSICIAN APPOINTMENT: TBD by patient, MD to f/u with call Discontinuation Summary:  Kuldip Ruiz has not returned to therapy since 3/17/21 and to be considered a self d/c at this time. PT POC closed. TREATMENT PLAN: 
Effective Dates: 21 TO 2021 (90 days). Frequency/Duration: 2 times a week for 90 Days GOALS: (Goals have been discussed and agreed upon with patient.) Short-Term Goals: 45 days  Goal Met 1. [address] will be independent with HEP to maintain functional gains made with therapy intervention. 1.  [] Date:  
2.  [address] will be able to walk x 10 min on the treadmill with no more than 2/10 pain in order to complete grocery shopping. 2.  [] Date: 3.  [address] will increase hip IR strength to 4-/5 to improve hip stability during, bending, lifting, stooping, and squatting. 3.  [] Date:  
4.  [address] will be able to perform 17 sit to stand transfers from standard height chair without UE support in order to improve strength and community access at restaurants. 4.  [] Date:  
5.   [address] will ascend/descend 10 in box step in unilateral support to improve ability to navigate stairs in household and community without pain in right hip. 5. [] Date:  
    
 Long Term Goals: 90 days Goal Met 1. Aren Shoulder Elianell to increase lower extremity functional scale by 20 points to show improvement in household and community mobility. 1.  [] Date:  
2. Hollie Mary will demonstrate 4/5 hip abductor strength on manual muscle testing to promote stance limb control with gait and stair negotiation to prevent low back shear. 2.  [] Date: 3. Hollie Mary will demonstrate appropriate lifting technique from floor to waist level with 90 lbs and no cues from therapist to complete household and community roles and responsibilities 3. [] Date:  
4. Hollie Mary will participate in walking program on treadmill x >=15 min with <= 0/10 pain to navigate community settings. 4.  [] Date: CLINICAL DECISION MAKING:  
Outcome Measure: Tool Used: Lower Extremity Functional Scale (LEFS) Score:  Initial: 49/80 Most Recent: X/80 (Date: -- ) Interpretation of Score: 20 questions each scored on a 5 point scale with 0 representing \"extreme difficulty or unable to perform\" and 4 representing \"no difficulty\". The lower the score, the greater the functional disability. 80/80 represents no disability. Minimal detectable change is 9 points. Tool Used: 30 sec, 18 in chair, no UE support Score:  Initial: 11 reps Most Recent: X (Date: -- ) Total Treatment Duration: 0 min Thank you for this referral, [facility], PT Referring Physician Signature: Mayela Christianson MD   
    
 
 
HISTORY:  
History of Present Injury/Illness (Reason for Referral): Chronic history of right sided hip pain that she has been able to manage with therapy, exercises, and stretching. Pt does report feelings of weakness in the right leg Pt does  yoga on a daily basis for past year. Pt reports a more recent flare up since COVD. Pt reports interruption in sleep. Pain pigeon pose and prolonged walking >10 min. Pt had approx 10 year ago had uterine and rectal prolapse. Pt does report that she does have occassional leakage. Pt reports sister was just dx with RA. Dominant Side:  
      RIGHT Pain Scale on day of evaluation: · Current: 4-5/10 · Worst: 8/10 Prior Level of Function/Work/Activity: 
Current level of function: walking greater than 10 min, sitting for long periods, difficulty climbing stairs, interrupted sleep 5 hours of sleep on average Prior level of function: chronic pain that comes and goes Work/hobbies: yoga, walking Other Clinical Tests:   
      Imaging: YES, radiograph Previous Treatment Approaches:   
      None in past year Social History/Living Environment:  
Pt lives in a one level home with family Past Medical History/Comorbidities: Ms. Filomena Perez  has a past medical history of Arthritis, Cancer ([address] and Hypertension. She also has no past medical history of Aneurysm ([address] Arrhythmia, Asthma, Autoimmune disease ([address] CAD (coronary artery disease), Chronic kidney disease, Chronic pain, Coagulation defects, COPD, Diabetes ([address] GERD (gastroesophageal reflux disease), Heart failure ([address] Liver disease, PUD (peptic ulcer disease), Seizures ([address] Stroke ([address] Thromboembolus ([address] or Thyroid disease. Ms. Filomena Perez  has a past surgical history that includes hx bunionectomy; hx tubal ligation; hx sg and bso (2006); and hx breast reduction. R shoulder surgery due to arthritis. Ambulatory/Rehab Services H2 Model Falls Risk Assessment Risk Factors: 
     No Risk Factors Identified Ability to Rise from Chair: 
     (0)  Ability to rise in a single movement Falls Prevention Plan: No modifications necessary Total: (5 or greater = High Risk): 0  
 ©2010 [facility] [address] [place] [address] [place] Patent #[id number]. Federal Law prohibits the replication, distribution or use without written permission from [facility] [employer] Current Medications: Current Outpatient Medications:  
  losartan 50 mg tab 100 mg, hydroCHLOROthiazide 25 mg tab 25 mg, Take 1 Dose by mouth daily. , Disp: , Rfl:  
  amLODIPine (NORVASC) 10 mg tablet, Take 10 mg by mouth daily. , Disp: , Rfl:  
  estradioL (VAGIFEM) 10 mcg tab vaginal tablet, Insert 10 mcg into vagina. Twice weekly, Disp: , Rfl:  
  calcium carb/vit D3/minerals (CALCIUM-VITAMIN D PO), Take 1 Tab by mouth daily. , Disp: , Rfl:  
  BIOTIN PO, Take 1 Tab by mouth daily. , Disp: , Rfl:  
  glucosamine/chondr castañeda A sod (OSTEO BI-FLEX PO), Take 1 Tab by mouth daily. , Disp: , Rfl:  
  multivitamin capsule, Take 1 Cap by mouth daily. , Disp: , Rfl:  
  OTHER,NON-FORMULARY,, daily. Tumeric/geoff , Disp: , Rfl:   
Date Last Reviewed:  5/3/2021 Number of Personal Factors/Comorbidities that affect the Plan of Care: 0: LOW COMPLEXITY EXAMINATION:  
Observation/Orthostatic Postural Assessment:   
· Gait= trendelenburg pattern, wide TERRY · Pt with minimal palpable pelvic floor recruitment with overriding gluteal contraction noted Functional Mobility:  Affecting participation in ambulation and standing · Squat with downward reach:able to complete · Stair Negotiation: able to complete with increased discomfort ROM: Assessed @ Initial Visit: 
AROM/PROM Joint: Eval Date:  2/17/21  Re-Assess Date:  Re-Assess Date: Active ROM RIGHT LEFT RIGHT LEFT RIGHT LEFT Knee Extension 0 0 Knee Flexion 125 125 Hip Extension 8 8 Hip Abduction NT NT Hip IR/ER 8/40 dg 10dg/ 35 dg      
Dorsiflexion NT NT Strength:   
 Eval Date:2/17/21  Re-Assess Date:  Re-Assess Date:   
  RIGHT LEFT RIGHT LEFT RIGHT LEFT Knee Flexion 4-/5 4-/5 Knee Extension 5/5 5/5 Hip Flexion 4-/5 4-/5 Hip Abduction 3+/5 3+/5 Hip IR 3/5 3/5 Dorsiflexion 4/5 4/5 Hip ER 3+/5 3+/5 Special Tests: Assessed @ Initial Visit · Lillie's Test - negative · Scouring Test - NT · IVIS - negative · SLR - negative Neurological Screen: Patient demonstrates/reports no loss in sensation Body Structures Involved: 1. Nerves 2. Bones 3. Joints 4. Muscles 5. Ligaments Body Functions Affected: 1. Sensory/Pain 2. Reproductive 3. Neuromusculoskeletal 
4. Movement Related Activities and Participation Affected: 1. Mobility 2. Self Care Number of elements that affect the Plan of Care: 1-2: LOW COMPLEXITY CLINICAL PRESENTATION:  
Presentation: Stable and uncomplicated: LOW COMPLEXITY Use of outcome tool(s) and clinical judgement create a POC that gives a: Clear prediction of patient's progress: LOW COMPLEXITY

## 2021-06-22 ENCOUNTER — TRANSCRIBE ORDER (OUTPATIENT)
Dept: SCHEDULING | Age: 66
End: 2021-06-22

## 2021-06-22 DIAGNOSIS — Z12.31 SCREENING MAMMOGRAM FOR HIGH-RISK PATIENT: Primary | ICD-10-CM

## 2021-08-17 ENCOUNTER — HOSPITAL ENCOUNTER (OUTPATIENT)
Dept: MAMMOGRAPHY | Age: 66
Discharge: HOME | End: 2021-08-17
Attending: INTERNAL MEDICINE
Payer: MEDICARE

## 2021-08-17 DIAGNOSIS — Z12.31 SCREENING MAMMOGRAM FOR HIGH-RISK PATIENT: ICD-10-CM

## 2021-08-17 PROCEDURE — 77063 BREAST TOMOSYNTHESIS BI: CPT

## 2022-03-19 PROBLEM — M70.61 TROCHANTERIC BURSITIS OF RIGHT HIP: Status: ACTIVE | Noted: 2021-04-05

## 2022-09-07 ENCOUNTER — HOSPITAL ENCOUNTER (OUTPATIENT)
Dept: MAMMOGRAPHY | Age: 67
Discharge: HOME | End: 2022-09-10
Payer: MEDICARE

## 2022-09-07 DIAGNOSIS — Z12.31 SCREENING MAMMOGRAM FOR BREAST CANCER: ICD-10-CM

## 2022-09-07 PROCEDURE — 77067 SCR MAMMO BI INCL CAD: CPT

## 2022-09-23 ENCOUNTER — OFFICE VISIT (OUTPATIENT)
Dept: ORTHOPEDIC SURGERY | Age: 67
End: 2022-09-23
Payer: MEDICARE

## 2022-09-23 VITALS — WEIGHT: 185 LBS | BODY MASS INDEX: 30.82 KG/M2 | HEIGHT: 65 IN

## 2022-09-23 DIAGNOSIS — M76.822 POSTERIOR TIBIAL TENDINITIS, LEFT LEG: Primary | ICD-10-CM

## 2022-09-23 DIAGNOSIS — Q74.2 ACCESSORY NAVICULAR BONE OF LEFT FOOT: ICD-10-CM

## 2022-09-23 DIAGNOSIS — M25.572 PAIN IN LEFT ANKLE AND JOINTS OF LEFT FOOT: ICD-10-CM

## 2022-09-23 PROCEDURE — G8400 PT W/DXA NO RESULTS DOC: HCPCS | Performed by: ORTHOPAEDIC SURGERY

## 2022-09-23 PROCEDURE — G8417 CALC BMI ABV UP PARAM F/U: HCPCS | Performed by: ORTHOPAEDIC SURGERY

## 2022-09-23 PROCEDURE — 1036F TOBACCO NON-USER: CPT | Performed by: ORTHOPAEDIC SURGERY

## 2022-09-23 PROCEDURE — 99213 OFFICE O/P EST LOW 20 MIN: CPT | Performed by: ORTHOPAEDIC SURGERY

## 2022-09-23 PROCEDURE — 3017F COLORECTAL CA SCREEN DOC REV: CPT | Performed by: ORTHOPAEDIC SURGERY

## 2022-09-23 PROCEDURE — G8427 DOCREV CUR MEDS BY ELIG CLIN: HCPCS | Performed by: ORTHOPAEDIC SURGERY

## 2022-09-23 PROCEDURE — 1090F PRES/ABSN URINE INCON ASSESS: CPT | Performed by: ORTHOPAEDIC SURGERY

## 2022-09-23 PROCEDURE — 1123F ACP DISCUSS/DSCN MKR DOCD: CPT | Performed by: ORTHOPAEDIC SURGERY

## 2022-09-23 NOTE — PROGRESS NOTES
Name: Hiwot Shore  YOB: 1955  Gender: female  MRN: [id number]    CC: Left foot pain    HPI:   04/20/2022: Last visit diagnosed: Left resolved  PTT and PTTD; Left planovalgus, accessory navicular  09/23/2022: She presents with her chronic medial foot pain. She has times where her pain is acceptable, but other times her pain limits her activity. August 2022, she took a trip to [facility] [place] and had problems despite refilling and taking Prednisone. ROS/Meds/PSH/PMH/FH/SH: reviewed today    Tobacco:  reports that she has never smoked. She has never used smokeless tobacco.     Physical Examination:  Patient appears to be alert and oriented with acceptable appearance. No obvious distress or SOB  CV: appears to have acceptable vascular color and capillary refill  Neuro: appears to have mostly intact light touch sensation   Skin:Accessory navicular soft tissue thickening   MS: Standing: Planovalgus: Gait good: Heel rise good  Left = accessory navicular region PTT pain; no ankle level pain  Left = full ankle/foot motion; 5/5 strength especially highlighting PTT; no instability or crepitance    XR: Left side: Standing AP lateral mortise ankle plus AP oblique foot taken 04/04/2022 with accessory navicular; some naviculocuneiform sagging; first MTP arthritis; tibial second claw toe; fifth metatarsal tuberosity exostosis  XR: No new x-rays felt needed today  XR Impression:  As above      Injection: Not applicable    Assessment:    Left planovalgus, Achilles contracture, accessory navicular syndrome    Plan:   The patient and I discussed the above assessment. We explored treatment options.      She has no true posterior tibial tendinitis and definitely no PTT deficiency  Her problem relates to the accessory navicular synchondrosis   Unfortunately, with her planovalgus, I do not feel she is a candidate for isolated Kidner  We discussed surgical treatment and she understands my thumb debilities  I outlined surgical treatment plan but of course final determination of my surgical partner  Left: Shobha PTT reconstruction; calcaneal osteotomy; Achilles lengthening  I went over surgery risk and complications and expected postoperative course  Advanced medical imaging: I see no indication today for MRI scan    DME: She has an ankle brace  She understands ankle care and protection  She has tried physical therapy and has shoes with good arch supports  We did discuss custom insoles but with her persistent pain, surgery first  Medication - OTC meds prn:   Surgical discussion: As noted above  Follow up: Surgical partner  Work status: Regular    This note was created using Dragon voice recognition software which may result in errors of speech and spelling recognition and word/phrase syntax errors.

## 2022-09-26 ENCOUNTER — OFFICE VISIT (OUTPATIENT)
Dept: ORTHOPEDIC SURGERY | Age: 67
End: 2022-09-26
Payer: MEDICARE

## 2022-09-26 DIAGNOSIS — M76.822 TIBIALIS TENDINITIS OF LEFT LOWER EXTREMITY: Primary | ICD-10-CM

## 2022-09-26 PROCEDURE — G8400 PT W/DXA NO RESULTS DOC: HCPCS | Performed by: ORTHOPAEDIC SURGERY

## 2022-09-26 PROCEDURE — 1090F PRES/ABSN URINE INCON ASSESS: CPT | Performed by: ORTHOPAEDIC SURGERY

## 2022-09-26 PROCEDURE — 1123F ACP DISCUSS/DSCN MKR DOCD: CPT | Performed by: ORTHOPAEDIC SURGERY

## 2022-09-26 PROCEDURE — G8417 CALC BMI ABV UP PARAM F/U: HCPCS | Performed by: ORTHOPAEDIC SURGERY

## 2022-09-26 PROCEDURE — 99214 OFFICE O/P EST MOD 30 MIN: CPT | Performed by: ORTHOPAEDIC SURGERY

## 2022-09-26 PROCEDURE — 3017F COLORECTAL CA SCREEN DOC REV: CPT | Performed by: ORTHOPAEDIC SURGERY

## 2022-09-26 PROCEDURE — G8427 DOCREV CUR MEDS BY ELIG CLIN: HCPCS | Performed by: ORTHOPAEDIC SURGERY

## 2022-09-26 PROCEDURE — 1036F TOBACCO NON-USER: CPT | Performed by: ORTHOPAEDIC SURGERY

## 2022-09-26 NOTE — PROGRESS NOTES
Name: Keesha Cabrales  YOB: 1955  Gender: female  MRN: [id number]    Summary:   Left posterior tibial tendon insufficiency, stage II       CC: Foot Pain (MET referral  Left foot pain)       HPI: Keesha Cabrales is a 75 y.o. female who presents with Foot Pain (MET referral  Left foot pain)  . This patient presents the office today with a 1+ year history of worsening left medial sided foot pain and deformity with progressive flattening foot. I reviewed her outside medical records from Dr. Debra Graham. She is tried bracing for this as well as physical therapy with ongoing pain affecting activities of daily living. History was obtained by Patient     ROS/Meds/PSH/PMH/FH/SH: I personally reviewed the patients standard intake form. Below are the pertinents    Tobacco:  reports that she has never smoked. She has never used smokeless tobacco.  Diabetes: None      Physical Examination:    Exam of the left foot and ankle shows a planovalgus hindfoot with tenderness to palpation over the posterior tibial tendon. She is unable to do a single-leg or double leg toe rise. She has good subtalar joint range of motion. She has palpable pulses and intact sensation.     Imaging:   I independently interpreted XR ordered by a different physician, taken from an outside facility of the left foot and ankle shows a planovalgus hindfoot with accessory navicular           Royal Suzanne SALINAS MD           Assessment:   Left stage II posterior tibial tendon insufficiency    Treatment Plan:   4 This is a chronic illness/condition with exacerbation and progression  Treatment at this time: Elective major surgery with procedural risk factors  Studies ordered: NO XR needed @ Next Visit    Weight-bearing status: WBAT        Return to work/work restrictions: none  No medications given    Left Spring ligament reconstruction with posterior tibial tendon reconstruction and possible flexor digitorum longus tendon transfer, medial calcaneal slide osteotomy, gastrocnemius recession, and possible cotton osteotomy  Outpatient-1 hour-sagittal saw with long blade, Arthrex 6.5 millimeter screws, C arm, possible Bio-Tenodesis set    The patient understands the diagnosis with conservative and surgical treatment. Surgery, the risks and complications, and the expected postoperative course were all outlined. Understands generalized risks and complications associated with any surgery, but specifically with the posterior tibial tendon repair/reconstruction with or without Achilles lengthening in the calcaneal osteotomy, understands malposition, malunion, and/or delayed union, any of which may require repeat surgical treatment. Understands the risk of skin and deep infection, the risk of bone infection, and the use of internal and possible external fixation that may require future hardware removal.  Understands the goal of surgery is attempted realignment and pain reduction with tendon healing but not complete pain relief. Understands posterior tibial tendon failure and the possible need to proceed with a fusion or long-term use of a brace or insole. Understands the risks of skin and deep infection, the risk of bone infection. Patient accepts and would like to proceed.

## 2022-10-25 DIAGNOSIS — M76.822 TIBIALIS TENDINITIS OF LEFT LOWER EXTREMITY: Primary | ICD-10-CM

## 2022-10-25 DIAGNOSIS — M76.822 POSTERIOR TIBIAL TENDINITIS, LEFT LEG: ICD-10-CM

## 2022-12-27 RX ORDER — TRAZODONE HYDROCHLORIDE 50 MG/1
25 TABLET ORAL NIGHTLY
COMMUNITY

## 2022-12-27 RX ORDER — LOSARTAN POTASSIUM AND HYDROCHLOROTHIAZIDE 25; 100 MG/1; MG/1
1 TABLET ORAL DAILY
COMMUNITY

## 2022-12-27 NOTE — PROGRESS NOTES
PLEASE CONTINUE TAKING ALL PRESCRIPTION MEDICATIONS UP TO THE DAY OF SURGERY UNLESS OTHERWISE DIRECTED BELOW. DISCONTINUE all vitamins and supplements 7 days prior to surgery. DISCONTINUE Non-Steriodal Anti-Inflammatory (NSAIDS) such as Advil and Aleve 5 days prior to surgery. Home Medications to take  the day of surgery    Calan, omeprazole           Home Medications   to Hold   biotin        Comments      On the day before surgery please take Acetaminophen 1000mg in the morning and then again before bed. You may substitute for Tylenol 650 mg. Please do not bring home medications with you on the day of surgery unless otherwise directed by your nurse. If you are instructed to bring home medications, please give them to your nurse as they will be administered by the nursing staff. If you have any questions, please call [address] [place] [address] [phone] or [facility] [phone]. A copy of this note was provided to the patient for reference.

## 2022-12-27 NOTE — PROGRESS NOTES
Patient verified name and     Order for consent WAS NOT found in EHR and matches case posting; patient verified. Type 1B surgery, PHONE assessment complete. Labs per surgeon: NONE  Labs per anesthesia protocol: POTASSIUM DOS-- ORDER PLACED IN CC  EKG: [facility] approved surgical skin cleanser and instructions given per hospital policy. Patient provided with and instructed on educational handouts including Guide to Surgery, Pain Management, Hand Hygiene, Blood Transfusion Education, and [place] [facility] Brochure. Patient answered medical/surgical history questions at their best of ability. All prior to admission medications documented in [facility]. Original medication prescription bottle WAS NOT visualized during patient appointment. Patient instructed to hold all vitamins 7 days prior to surgery and NSAIDS 5 days prior to surgery, patient verbalized understanding. Patient teach back successful and patient demonstrates knowledge of instructions.

## 2023-01-03 ENCOUNTER — APPOINTMENT (OUTPATIENT)
Dept: GENERAL RADIOLOGY | Age: 68
End: 2023-01-03
Attending: ORTHOPAEDIC SURGERY
Payer: MEDICARE

## 2023-01-03 ENCOUNTER — HOSPITAL ENCOUNTER (OUTPATIENT)
Age: 68
Setting detail: OUTPATIENT SURGERY
Discharge: HOME | End: 2023-01-03
Attending: ORTHOPAEDIC SURGERY | Admitting: ORTHOPAEDIC SURGERY
Payer: MEDICARE

## 2023-01-03 ENCOUNTER — ANESTHESIA EVENT (OUTPATIENT)
Dept: SURGERY | Age: 68
End: 2023-01-03
Payer: MEDICARE

## 2023-01-03 ENCOUNTER — ANESTHESIA (OUTPATIENT)
Dept: SURGERY | Age: 68
End: 2023-01-03
Payer: MEDICARE

## 2023-01-03 VITALS
TEMPERATURE: 97.5 F | SYSTOLIC BLOOD PRESSURE: 167 MMHG | HEIGHT: 66 IN | WEIGHT: 192 LBS | OXYGEN SATURATION: 92 % | DIASTOLIC BLOOD PRESSURE: 78 MMHG | HEART RATE: 68 BPM | RESPIRATION RATE: 18 BRPM | BODY MASS INDEX: 30.86 KG/M2

## 2023-01-03 DIAGNOSIS — G89.18 ACUTE POST-OPERATIVE PAIN: Primary | ICD-10-CM

## 2023-01-03 DIAGNOSIS — M76.822 TIBIALIS TENDINITIS OF LEFT LOWER EXTREMITY: ICD-10-CM

## 2023-01-03 LAB — POTASSIUM BLD-SCNC: 3.4 MMOL/L (ref 3.5–5.1)

## 2023-01-03 PROCEDURE — 6360000002 HC RX W HCPCS

## 2023-01-03 PROCEDURE — 84132 ASSAY OF SERUM POTASSIUM: CPT

## 2023-01-03 PROCEDURE — 6370000000 HC RX 637 (ALT 250 FOR IP): Performed by: ANESTHESIOLOGY

## 2023-01-03 PROCEDURE — C1713 ANCHOR/SCREW BN/BN,TIS/BN: HCPCS | Performed by: ORTHOPAEDIC SURGERY

## 2023-01-03 PROCEDURE — 6360000002 HC RX W HCPCS: Performed by: ANESTHESIOLOGY

## 2023-01-03 PROCEDURE — 6360000002 HC RX W HCPCS: Performed by: NURSE PRACTITIONER

## 2023-01-03 PROCEDURE — 7100000010 HC PHASE II RECOVERY - FIRST 15 MIN: Performed by: ORTHOPAEDIC SURGERY

## 2023-01-03 PROCEDURE — 64445 NJX AA&/STRD SCIATIC NRV IMG: CPT | Performed by: ANESTHESIOLOGY

## 2023-01-03 PROCEDURE — 3700000000 HC ANESTHESIA ATTENDED CARE: Performed by: ORTHOPAEDIC SURGERY

## 2023-01-03 PROCEDURE — 64447 NJX AA&/STRD FEMORAL NRV IMG: CPT | Performed by: ANESTHESIOLOGY

## 2023-01-03 PROCEDURE — 2500000003 HC RX 250 WO HCPCS

## 2023-01-03 PROCEDURE — 3600000014 HC SURGERY LEVEL 4 ADDTL 15MIN: Performed by: ORTHOPAEDIC SURGERY

## 2023-01-03 PROCEDURE — C1769 GUIDE WIRE: HCPCS | Performed by: ORTHOPAEDIC SURGERY

## 2023-01-03 PROCEDURE — 2500000003 HC RX 250 WO HCPCS: Performed by: ANESTHESIOLOGY

## 2023-01-03 PROCEDURE — 2709999900 HC NON-CHARGEABLE SUPPLY: Performed by: ORTHOPAEDIC SURGERY

## 2023-01-03 PROCEDURE — 7100000011 HC PHASE II RECOVERY - ADDTL 15 MIN: Performed by: ORTHOPAEDIC SURGERY

## 2023-01-03 PROCEDURE — 7100000000 HC PACU RECOVERY - FIRST 15 MIN: Performed by: ORTHOPAEDIC SURGERY

## 2023-01-03 PROCEDURE — 7100000001 HC PACU RECOVERY - ADDTL 15 MIN: Performed by: ORTHOPAEDIC SURGERY

## 2023-01-03 PROCEDURE — 2580000003 HC RX 258: Performed by: NURSE PRACTITIONER

## 2023-01-03 PROCEDURE — 3700000001 HC ADD 15 MINUTES (ANESTHESIA): Performed by: ORTHOPAEDIC SURGERY

## 2023-01-03 PROCEDURE — 2720000010 HC SURG SUPPLY STERILE: Performed by: ORTHOPAEDIC SURGERY

## 2023-01-03 PROCEDURE — 3600000004 HC SURGERY LEVEL 4 BASE: Performed by: ORTHOPAEDIC SURGERY

## 2023-01-03 DEVICE — SCREW BONE L50MM DIA6.7MM THRD L18MM FT ANK TI ST SELF DRL: Type: IMPLANTABLE DEVICE | Site: ANKLE | Status: FUNCTIONAL

## 2023-01-03 DEVICE — BIOSYNC ANATOMIC COTTON WEDGE, 20X7.5 MM
Type: IMPLANTABLE DEVICE | Site: ANKLE | Status: FUNCTIONAL
Brand: ARTHREX®

## 2023-01-03 DEVICE — TENODESIS SCREW, BIOCOMPOSITE
Type: IMPLANTABLE DEVICE | Site: ANKLE | Status: FUNCTIONAL
Brand: ARTHREX®

## 2023-01-03 RX ORDER — SODIUM CHLORIDE 0.9 % (FLUSH) 0.9 %
5-40 SYRINGE (ML) INJECTION PRN
Status: DISCONTINUED | OUTPATIENT
Start: 2023-01-03 | End: 2023-01-03 | Stop reason: HOSPADM

## 2023-01-03 RX ORDER — OXYCODONE HYDROCHLORIDE 5 MG/1
5 TABLET ORAL EVERY 6 HOURS PRN
Qty: 20 TABLET | Refills: 0 | Status: SHIPPED | OUTPATIENT
Start: 2023-01-03 | End: 2023-01-08

## 2023-01-03 RX ORDER — SODIUM CHLORIDE 0.9 % (FLUSH) 0.9 %
5-40 SYRINGE (ML) INJECTION EVERY 12 HOURS SCHEDULED
Status: DISCONTINUED | OUTPATIENT
Start: 2023-01-03 | End: 2023-01-03 | Stop reason: HOSPADM

## 2023-01-03 RX ORDER — LIDOCAINE HYDROCHLORIDE 20 MG/ML
INJECTION, SOLUTION EPIDURAL; INFILTRATION; INTRACAUDAL; PERINEURAL PRN
Status: DISCONTINUED | OUTPATIENT
Start: 2023-01-03 | End: 2023-01-03 | Stop reason: SDUPTHER

## 2023-01-03 RX ORDER — ACETAMINOPHEN 500 MG
1000 TABLET ORAL ONCE
Status: COMPLETED | OUTPATIENT
Start: 2023-01-03 | End: 2023-01-03

## 2023-01-03 RX ORDER — SODIUM CHLORIDE 9 MG/ML
INJECTION, SOLUTION INTRAVENOUS PRN
Status: DISCONTINUED | OUTPATIENT
Start: 2023-01-03 | End: 2023-01-03 | Stop reason: HOSPADM

## 2023-01-03 RX ORDER — HALOPERIDOL 5 MG/ML
1 INJECTION INTRAMUSCULAR
Status: DISCONTINUED | OUTPATIENT
Start: 2023-01-03 | End: 2023-01-03 | Stop reason: HOSPADM

## 2023-01-03 RX ORDER — FENTANYL CITRATE 50 UG/ML
100 INJECTION, SOLUTION INTRAMUSCULAR; INTRAVENOUS
Status: COMPLETED | OUTPATIENT
Start: 2023-01-03 | End: 2023-01-03

## 2023-01-03 RX ORDER — ASPIRIN 81 MG/1
81 TABLET ORAL 2 TIMES DAILY
Qty: 84 TABLET | Refills: 0 | Status: SHIPPED | OUTPATIENT
Start: 2023-01-03

## 2023-01-03 RX ORDER — HYDROMORPHONE HCL 110MG/55ML
0.5 PATIENT CONTROLLED ANALGESIA SYRINGE INTRAVENOUS EVERY 5 MIN PRN
Status: DISCONTINUED | OUTPATIENT
Start: 2023-01-03 | End: 2023-01-03 | Stop reason: HOSPADM

## 2023-01-03 RX ORDER — SODIUM CHLORIDE, SODIUM LACTATE, POTASSIUM CHLORIDE, CALCIUM CHLORIDE 600; 310; 30; 20 MG/100ML; MG/100ML; MG/100ML; MG/100ML
INJECTION, SOLUTION INTRAVENOUS CONTINUOUS
Status: DISCONTINUED | OUTPATIENT
Start: 2023-01-03 | End: 2023-01-03 | Stop reason: HOSPADM

## 2023-01-03 RX ORDER — IPRATROPIUM BROMIDE AND ALBUTEROL SULFATE 2.5; .5 MG/3ML; MG/3ML
1 SOLUTION RESPIRATORY (INHALATION)
Status: COMPLETED | OUTPATIENT
Start: 2023-01-03 | End: 2023-01-03

## 2023-01-03 RX ORDER — MIDAZOLAM HYDROCHLORIDE 2 MG/2ML
2 INJECTION, SOLUTION INTRAMUSCULAR; INTRAVENOUS
Status: COMPLETED | OUTPATIENT
Start: 2023-01-03 | End: 2023-01-03

## 2023-01-03 RX ORDER — SODIUM CHLORIDE, SODIUM LACTATE, POTASSIUM CHLORIDE, CALCIUM CHLORIDE 600; 310; 30; 20 MG/100ML; MG/100ML; MG/100ML; MG/100ML
INJECTION, SOLUTION INTRAVENOUS CONTINUOUS
Status: DISCONTINUED | OUTPATIENT
Start: 2023-01-03 | End: 2023-01-03 | Stop reason: SDUPTHER

## 2023-01-03 RX ORDER — CEPHALEXIN 500 MG/1
500 CAPSULE ORAL 4 TIMES DAILY
Qty: 12 CAPSULE | Refills: 0 | Status: SHIPPED | OUTPATIENT
Start: 2023-01-03

## 2023-01-03 RX ORDER — LIDOCAINE HYDROCHLORIDE 10 MG/ML
1 INJECTION, SOLUTION INFILTRATION; PERINEURAL
Status: DISCONTINUED | OUTPATIENT
Start: 2023-01-03 | End: 2023-01-03 | Stop reason: HOSPADM

## 2023-01-03 RX ORDER — PROCHLORPERAZINE EDISYLATE 5 MG/ML
5 INJECTION INTRAMUSCULAR; INTRAVENOUS
Status: DISCONTINUED | OUTPATIENT
Start: 2023-01-03 | End: 2023-01-03 | Stop reason: HOSPADM

## 2023-01-03 RX ORDER — SODIUM CHLORIDE 9 MG/ML
INJECTION, SOLUTION INTRAVENOUS PRN
Status: DISCONTINUED | OUTPATIENT
Start: 2023-01-03 | End: 2023-01-03 | Stop reason: SDUPTHER

## 2023-01-03 RX ORDER — PROPOFOL 10 MG/ML
INJECTION, EMULSION INTRAVENOUS PRN
Status: DISCONTINUED | OUTPATIENT
Start: 2023-01-03 | End: 2023-01-03 | Stop reason: SDUPTHER

## 2023-01-03 RX ORDER — OXYCODONE HYDROCHLORIDE 5 MG/1
5 TABLET ORAL
Status: DISCONTINUED | OUTPATIENT
Start: 2023-01-03 | End: 2023-01-03 | Stop reason: HOSPADM

## 2023-01-03 RX ADMIN — PROPOFOL 30 MG: 10 INJECTION, EMULSION INTRAVENOUS at 14:15

## 2023-01-03 RX ADMIN — IPRATROPIUM BROMIDE AND ALBUTEROL SULFATE 1 AMPULE: .5; 3 SOLUTION RESPIRATORY (INHALATION) at 15:37

## 2023-01-03 RX ADMIN — LIDOCAINE HYDROCHLORIDE 40 MG: 20 INJECTION, SOLUTION EPIDURAL; INFILTRATION; INTRACAUDAL; PERINEURAL at 14:15

## 2023-01-03 RX ADMIN — ACETAMINOPHEN 1000 MG: 500 TABLET, FILM COATED ORAL at 13:29

## 2023-01-03 RX ADMIN — MIDAZOLAM 2 MG: 1 INJECTION INTRAMUSCULAR; INTRAVENOUS at 13:26

## 2023-01-03 RX ADMIN — ROPIVACAINE HYDROCHLORIDE 15 ML: 5 INJECTION, SOLUTION EPIDURAL; INFILTRATION; PERINEURAL at 13:30

## 2023-01-03 RX ADMIN — ROPIVACAINE HYDROCHLORIDE 30 ML: 5 INJECTION, SOLUTION EPIDURAL; INFILTRATION; PERINEURAL at 13:26

## 2023-01-03 RX ADMIN — Medication 2000 MG: at 14:18

## 2023-01-03 RX ADMIN — FENTANYL CITRATE 50 MCG: 50 INJECTION, SOLUTION INTRAMUSCULAR; INTRAVENOUS at 13:26

## 2023-01-03 RX ADMIN — SODIUM CHLORIDE, POTASSIUM CHLORIDE, SODIUM LACTATE AND CALCIUM CHLORIDE: 600; 310; 30; 20 INJECTION, SOLUTION INTRAVENOUS at 12:30

## 2023-01-03 RX ADMIN — PROPOFOL 140 MCG/KG/MIN: 10 INJECTION, EMULSION INTRAVENOUS at 14:16

## 2023-01-03 RX ADMIN — DEXAMETHASONE SODIUM PHOSPHATE 4 MG: 4 INJECTION, SOLUTION INTRAMUSCULAR; INTRAVENOUS at 13:26

## 2023-01-03 ASSESSMENT — PAIN - FUNCTIONAL ASSESSMENT: PAIN_FUNCTIONAL_ASSESSMENT: 0-10

## 2023-01-03 ASSESSMENT — PAIN SCALES - GENERAL
PAINLEVEL_OUTOF10: 0

## 2023-01-03 NOTE — DISCHARGE INSTRUCTIONS
INSTRUCTIONS FOLLOWING FOOT SURGERY     ACTIVITY   Elevate foot. No Ice   1.)No weight bearing. Use crutches or knee walker until seen in follow up appointment     Blood clot prevention:   As instructed by Dr Leon Kayser: Take Aspirin 81 mg twice daily if okay with your medical doctor and you have no GI ulcer. Get up and out of bed frequently. While in bed move the legs as much as possible. DRESSING CARE Keep dry and in place until follow up appointment. Cover with cast bag or plastic bag when showering. Let the office know if dressing gets saturated with water. Don't put anything into the splint to relieve itching etc.     CALL YOUR DOCTOR IF YOU HAVE   Excessive bleeding that does not stop after holding mild pressure over the area. Temperature of 101 degrees or above. Redness, excessive swelling or bruising, and/or green or yellow, smelly discharge from incision. Loss of sensation - cold, white or blue toes. DIET   Day of Surgery: Clear liquids until no nausea or vomiting; then light, bland diet (Baked chicken, plain rice, grits, scrambled egg, toast). Nothing Greasy, fried or spicy today   Advance to regular diet on second day, unless your doctor orders otherwise. PAIN   Take pain medications as directed by your doctor. Call your doctor if pain is NOT relieved by medication. After general anesthesia or intravenous sedation, for 24 hours or while taking prescription Narcotics:    Limit your activities    A responsible adult needs to be with you for the next 24 hours    Do not drive and operate hazardous machinery    Do not make important personal or business decisions    Do not drink alcoholic beverages    If you have not urinated within 8 hours after discharge, and you are experiencing discomfort from urinary retention, please go to the nearest ED. If you have sleep apnea and have a CPAP machine, please use it for all naps and sleeping.       Please use caution when taking narcotics and any of your home medications that may cause drowsiness. * Please give a list of your current medications to your Primary Care Provider. * Please update this list whenever your medications are discontinued, doses are   changed, or new medications (including over-the-counter products) are added. * Please carry medication information at all times in case of emergency situations. These are general instructions for a healthy lifestyle:    No smoking/ No tobacco products/ Avoid exposure to second hand smoke    Surgeon General's Warning: Quitting smoking now greatly reduces serious risk to your health. Obesity, smoking, and sedentary lifestyle greatly increases your risk for illness    A healthy diet, regular physical exercise & weight monitoring are important for maintaining a healthy lifestyle     You may be retaining fluid if you have a history of heart failure or if you experience any of the following symptoms: Weight gain of 3 pounds or more overnight or 5 pounds in a week, increased swelling in our hands or feet or shortness of breath while lying flat in bed. Please call your doctor as soon as you notice any of these symptoms; do not wait until your next office visit. Learning About How to Use Crutches     Your Care Instructions   Crutches can help you walk when you have an injured hip, leg, knee, ankle, or foot. Your doctor will tell you how much weight--if any--you can put on your leg. Be sure your crutches fit you. When you stand up in your normal posture, there should be space for two or three fingers between the top of the crutch and your armpit. When you let your hands hang down, the hand  should be at your wrists. When you put your hands on the hand , your elbows should be slightly bent. To stay safe when using crutches:    Look straight ahead, not down at your feet. Clear away small rugs, cords, or anything else that could cause you to trip, slip, or fall.     Be very careful around pets and small children. They can get in your path when you least expect it. Be sure the rubber tips on your crutches are clean and in good condition to help prevent slipping. Avoid slick conditions, such as wet floors and snowy or icy driveways. In bad weather, be especially careful on curbs and steps. How to use crutches   Getting ready to walk   1. Bend your elbows slightly. Press the padded top parts of the crutches against your sides, under your armpits. 2. If you have been told not to put any weight on your injured leg, keep that leg bent and off the ground. Walking with crutches   1. Put both crutches about 12 inches in front of you. 2. Put your weight on the handgrips, not on the pads under your arms. (Constant pressure against your underarms can cause numbness.) Swing your body forward. (If you have been told not to put any weight on your injured leg, keep that leg bent and off the ground.)   3. To complete the step, put your weight on the strong leg. 4. Move your crutches about 12 inches in front of you, and start the next step. 5. When you're confident using the crutches, you can move the crutches and your injured leg at the same time. Then push straight down on the crutches as you step past the crutches with your strong leg, as you would in normal walking. 6. Take small steps. 7. Use ramps and elevators when you can. Sitting down   1. To sit, back up to the chair. Use one hand to hold both crutches by the handgrips, beside your injured leg. With the other hand, hold onto the seat and slowly lower yourself onto the chair. 2. Lay the crutches on the ground near your chair. If you prop them up, they may fall over. Getting up from a chair   1. To get up from a chair,  the crutches and put them in one hand beside your injured leg. 2. Put your weight on the handgrips of the crutches and on your strong leg to stand up. Walking up stairs   1.  To go up stairs, step up with your strong leg and then bring the crutches and your injured leg to the upper step. 2. For stairs that have handrails: Put both crutches under the arm opposite the handrail. Use the hand opposite the handrail to hold both crutches by the handgrips. 3. Hold onto the handrail as you go up. Put your strong leg on the step first when you go up. Walking down stairs   1. To go down stairs, put your crutches and injured leg on the lower step. 2. Bring your strong leg to the lower step. This saying may help you remember: \"Up with the good, down with the bad. \"   3. For stairs that have handrails: Put both crutches under the arm opposite the handrail. Use the hand opposite the handrail to hold both crutches by the handgrips. Hold onto the handrail as you go down. Follow the same process you use for stairs: Lead with your crutches and injured leg on the way down.

## 2023-01-03 NOTE — H&P
Outpatient Surgery History and Physical:  Viv Hapmton was seen and examined. CHIEF COMPLAINT:   left foot. PE:   /60   Pulse 71   Temp 98 °F (36.7 °C) (Oral)   Resp 16   Ht 5' 6\" (1.676 m)   Wt 192 lb (87.1 kg)   SpO2 93%   BMI 30.99 kg/m²     Heart:   Regular rhythm      Lungs:  Are clear      Past Medical History:    Past Medical History:   Diagnosis Date    Arthritis     right hand/ shoulder and feet    Cancer ([address]     melanoma- moles removed    GERD (gastroesophageal reflux disease)     OCC MED    Hypertension     controlled w/med    Insomnia        Surgical History:   Past Surgical History:   Procedure Laterality Date    BREAST REDUCTION SURGERY      BUNIONECTOMY      bilat    ORTHOPEDIC SURGERY Right 2020    shoulder surg    SABAS AND BSO (CERVIX REMOVED)  2006    \"and prolapse surgery for cystocele and rectocele\"    TUBAL LIGATION         Social History: Patient  reports that she has never smoked. She has never used smokeless tobacco. She reports current alcohol use of about 2.5 standard drinks per week. She reports that she does not currently use drugs. Family History:   Family History   Problem Relation Age of Onset    Breast Cancer Mother 36    Heart Disease Father        Allergies: Reviewed per EMR  Amlodipine, Sulfa antibiotics, and Adhesive tape    Medications:    Prior to Admission medications    Medication Sig Start Date End Date Taking?  Authorizing Provider   verapamil (CALAN SR) 180 MG extended release tablet Take 360 mg by mouth daily Along with  120 mg verapamil-- = TOTAL 480 MG 1 X DAILY   Yes Historical Provider, MD   losartan-hydroCHLOROthiazide (HYZAAR) 100-25 MG per tablet Take 1 tablet by mouth daily   Yes Historical Provider, MD   traZODone (DESYREL) 50 MG tablet Take 25 mg by mouth nightly   Yes Historical Provider, MD   BIOTIN PO Take 1 tablet by mouth daily    Ar Automatic Reconciliation   omeprazole (PRILOSEC) 40 MG delayed release capsule Take 40 mg by mouth daily as needed 2/7/22   Ar Automatic Reconciliation   verapamil (CALAN SR) 120 MG extended release tablet Take 120 mg by mouth daily 3/3/22   Ar Automatic Reconciliation       The surgery is planned for the Left Spring ligament reconstruction with posterior tibial tendon reconstruction and possible flexor digitorum longus tendon transfer, medial calcaneal slide osteotomy, gastrocnemius recession, and possible cotton osteotomy. History and physical has been reviewed. The patient has been examined. There have been no significant clinical changes since the completion of the originally dated History and Physical.  Patient identified by surgeon; surgical site was confirmed by patient and surgeon. The patient is here today for outpatient surgery. I have examined the patient, no changes are noted in the patient's medical status. Necessity for the procedure/care is still present and the history and physical above is current. See the office notes for the full long term history of the problem. Please see the recent office notes for the musculoskeletal examination.     Signed By: RACHELLE Mclaughlin CNP     January 3, 2023 2:26 PM

## 2023-01-03 NOTE — ANESTHESIA PROCEDURE NOTES
Peripheral Block    Patient location during procedure: pre-op  Reason for block: post-op pain management and at surgeon's request  Start time: 1/3/2023 1:30 PM  End time: 1/3/2023 1:32 PM  Staffing  Performed: anesthesiologist   Anesthesiologist: Toney Mai MD  Preanesthetic Checklist  Completed: patient identified, IV checked, site marked, risks and benefits discussed, surgical/procedural consents, equipment checked, pre-op evaluation, timeout performed, anesthesia consent given, oxygen available and monitors applied/VS acknowledged  Peripheral Block   Patient position: supine  Prep: ChloraPrep  Provider prep: mask  Patient monitoring: cardiac monitor, continuous pulse ox, frequent blood pressure checks, IV access and oxygen  Block type: Femoral  Adductor canal  Laterality: left  Injection technique: single-shot  Guidance: ultrasound guided  Local infiltration: ropivacaine  Local infiltration: ropivacaine    Needle   Needle type: insulated echogenic nerve stimulator needle   Needle gauge: 21 G  Needle localization: ultrasound guidance  Needle length: 10 cm  Assessment   Injection assessment: negative aspiration for heme, no paresthesia on injection, local visualized surrounding nerve on ultrasound and no intravascular symptoms  Paresthesia pain: none  Slow fractionated injection: yes  Hemodynamics: stable  Real-time US image taken/store: yes  Outcomes: patient tolerated procedure well and uncomplicated    Additional Notes  -Block placed for post op pain at surgeon's request.     -Ultrasound used to identify anatomy of nerve bundle. -Needle placement and local injection at perineural area confirmed with real time ultrasound guidance.     -Local visualized with ultrasound surrounding nerve. -Permanent Image taken and placed on chart.       Medications Administered  EPINEPHrine PF 1 MG/ML Soln, ropivacaine 0.5% Soln (Mixture components: EPINEPHrine PF 1 MG/ML Soln, 0.005 mL; ropivacaine 0.5% Soln, 1 mL) - Perineural   15 mL - 1/3/2023 1:30:00 PM  dexamethasone 4 MG/ML - Perineural   2 mg - 1/3/2023 1:30:00 PM

## 2023-01-03 NOTE — ANESTHESIA PRE PROCEDURE
Department of Anesthesiology  Preprocedure Note       Name:  Glen Hawley   Age:  75 y.o.  :  1955                                          MRN:  [id number]         Date:  1/3/2023      Surgeon: Rochelle Diallo):  Caro Camarillo MD    Procedure: Procedure(s):  left spring ligament reconstruction  posterior tibial tendon reconstruction  possible flexor digitorum longus tendon transfer  left medial slide calcaneal osteotomy, cotton osteotomy CPT code 72754    Medications prior to admission:   Prior to Admission medications    Medication Sig Start Date End Date Taking?  Authorizing Provider   verapamil (CALAN SR) 180 MG extended release tablet Take 360 mg by mouth daily Along with  120 mg verapamil-- = TOTAL 480 MG 1 X DAILY   Yes Historical Provider, MD   losartan-hydroCHLOROthiazide (HYZAAR) 100-25 MG per tablet Take 1 tablet by mouth daily   Yes Historical Provider, MD   traZODone (DESYREL) 50 MG tablet Take 25 mg by mouth nightly   Yes Historical Provider, MD   BIOTIN PO Take 1 tablet by mouth daily    Ar Automatic Reconciliation   omeprazole (PRILOSEC) 40 MG delayed release capsule Take 40 mg by mouth daily as needed 22   Ar Automatic Reconciliation   verapamil (CALAN SR) 120 MG extended release tablet Take 120 mg by mouth daily 3/3/22   Ar Automatic Reconciliation       Current medications:    Current Facility-Administered Medications   Medication Dose Route Frequency Provider Last Rate Last Admin    ceFAZolin (ANCEF) 2000 mg in sterile water 20 mL IV syringe  2,000 mg IntraVENous On Call to [address], APRN - CNP        lactated ringers infusion   IntraVENous Continuous Everlyn Ast, APRN - CNP        sodium chloride flush 0.9 % injection 5-40 mL  5-40 mL IntraVENous 2 times per day Everlyn Ast, APRN - CNP        sodium chloride flush 0.9 % injection 5-40 mL  5-40 mL IntraVENous PRN Everlyn Ast, APRN - CNP        0.9 % sodium chloride infusion IntraVENous PRN RACHELLE Guido - CNP        lidocaine 1 % injection 1 mL  1 mL IntraDERmal Once PRN Red Romo MD        acetaminophen (TYLENOL) tablet 1,000 mg  1,000 mg Oral Once Rde Romo MD        fentaNYL (SUBLIMAZE) injection 100 mcg  100 mcg IntraVENous Once PRN Red Romo MD        sodium chloride flush 0.9 % injection 5-40 mL  5-40 mL IntraVENous 2 times per day Red Romo MD        sodium chloride flush 0.9 % injection 5-40 mL  5-40 mL IntraVENous PRN Red Romo MD        midazolam PF (VERSED) injection 2 mg  2 mg IntraVENous Once PRN Red Romo MD           Allergies: Allergies   Allergen Reactions    Amlodipine Swelling    Sulfa Antibiotics Diarrhea    Adhesive Tape Rash       Problem List:    Patient Active Problem List   Diagnosis Code    Trochanteric bursitis of right hip M70.61    Tibialis tendinitis of left lower extremity N99.248       Past Medical History:        Diagnosis Date    Arthritis     right hand/ shoulder and feet    Cancer ([address]     melanoma- moles removed    GERD (gastroesophageal reflux disease)     OCC MED    Hypertension     controlled w/med    Insomnia        Past Surgical History:        Procedure Laterality Date    BREAST REDUCTION SURGERY      BUNIONECTOMY      bilat    ORTHOPEDIC SURGERY Right 2020    shoulder surg    SABAS AND BSO (CERVIX REMOVED)  2006    \"and prolapse surgery for cystocele and rectocele\"    TUBAL LIGATION         Social History:    Social History     Tobacco Use    Smoking status: Never    Smokeless tobacco: Never   Substance Use Topics    Alcohol use:  Yes     Alcohol/week: 2.5 standard drinks     Comment: occ                                Counseling given: Not Answered      Vital Signs (Current):   Vitals:    12/27/22 0844 01/03/23 1209   BP:  (!) 182/84   Pulse:  86   Resp:  16   Temp:  98 °F (36.7 °C)   TempSrc:  Oral   SpO2:  93%   Weight: 192 lb (87.1 kg)    Height: 5' 6\" (1.676 m) BP Readings from Last 3 Encounters:   01/03/23 (!) 182/84       NPO Status: Time of last liquid consumption: 2200                        Time of last solid consumption: 2200                        Date of last liquid consumption: 01/02/23                        Date of last solid food consumption: 01/02/23    BMI:   Wt Readings from Last 3 Encounters:   12/27/22 192 lb (87.1 kg)   09/23/22 185 lb (83.9 kg)   04/20/22 189 lb (85.7 kg)     Body mass index is 30.99 kg/m². CBC: No results found for: WBC, RBC, HGB, HCT, MCV, RDW, PLT    CMP:   Lab Results   Component Value Date/Time    K 3.3 02/05/2020 02:25 PM       POC Tests:   Recent Labs     01/03/23  1158   POCK 3.4*       Coags: No results found for: PROTIME, INR, APTT    HCG (If Applicable): No results found for: PREGTESTUR, PREGSERUM, HCG, HCGQUANT     ABGs: No results found for: PHART, PO2ART, QEG5JDH, EGY6PJK, BEART, Q2DRUWXT     Type & Screen (If Applicable):  No results found for: LABABO, LABRH    Drug/Infectious Status (If Applicable):  No results found for: HIV, HEPCAB    COVID-19 Screening (If Applicable): No results found for: COVID19        Anesthesia Evaluation  Patient summary reviewed and Nursing notes reviewed no history of anesthetic complications:   Airway: Mallampati: II     Neck ROM: full  Mouth opening: > = 3 FB   Dental: normal exam         Pulmonary:Negative Pulmonary ROS breath sounds clear to auscultation                             Cardiovascular:  Exercise tolerance: good (>4 METS),   (+) hypertension:,                   Neuro/Psych:   Negative Neuro/Psych ROS              GI/Hepatic/Renal:   (+) GERD: well controlled,           Endo/Other:    (+) : arthritis: OA., .                 Abdominal:             Vascular: negative vascular ROS.          Other Findings:           Anesthesia Plan      TIVA     ASA 2     (Popliteal and adductor canal blocks for post-op pain)  Induction: intravenous. Anesthetic plan and risks discussed with patient and spouse.               Post-op pain plan if not by surgeon: single peripheral nerve block            Justin Joshi MD   1/3/2023

## 2023-01-03 NOTE — ANESTHESIA PROCEDURE NOTES
Peripheral Block    Patient location during procedure: pre-op  Reason for block: post-op pain management and at surgeon's request  Start time: 1/3/2023 1:26 PM  End time: 1/3/2023 1:30 PM  Staffing  Performed: anesthesiologist   Anesthesiologist: Esmer Hallman MD  Preanesthetic Checklist  Completed: patient identified, IV checked, site marked, risks and benefits discussed, surgical/procedural consents, equipment checked, pre-op evaluation, timeout performed, anesthesia consent given, oxygen available and monitors applied/VS acknowledged  Peripheral Block   Patient position: right lateral decubitus  Prep: ChloraPrep  Provider prep: mask  Patient monitoring: cardiac monitor, continuous pulse ox, frequent blood pressure checks, IV access and oxygen  Block type: Sciatic  Popliteal  Laterality: left  Injection technique: single-shot  Guidance: ultrasound guided    Needle   Needle type: insulated echogenic nerve stimulator needle   Needle gauge: 21 G  Needle localization: ultrasound guidance  Needle length: 10 cm  Assessment   Injection assessment: negative aspiration for heme, no paresthesia on injection, local visualized surrounding nerve on ultrasound and no intravascular symptoms  Paresthesia pain: none  Slow fractionated injection: yes  Hemodynamics: stable  Real-time US image taken/store: yes  Outcomes: uncomplicated and patient tolerated procedure well    Additional Notes  -Block placed for post op pain at surgeon's request.     -Ultrasound used to identify anatomy of nerve bundle. -Needle placement and local injection at perineural area confirmed with real time ultrasound guidance.     -Local visualized with ultrasound surrounding nerve. -Permanent Image taken and placed on chart.       Medications Administered  EPINEPHrine PF 1 MG/ML Soln, ropivacaine 0.5% Soln (Mixture components: EPINEPHrine PF 1 MG/ML Soln, 0.005 mL; ropivacaine 0.5% Soln, 1 mL) - Perineural   30 mL - 1/3/2023 1:26:00 PM  dexamethasone 4 MG/ML - Perineural   4 mg - 1/3/2023 1:26:00 PM

## 2023-01-03 NOTE — BRIEF OP NOTE
Brief Postoperative Note      Patient: Kane Frederick  YOB: 1955  MRN: [id number]    Date of Procedure: 1/3/2023    Pre-Op Diagnosis: Tibialis tendinitis of left lower extremity [M76.822]    Post-Op Diagnosis: Same       Procedure(s):  left spring ligament reconstruction  posterior tibial tendon reconstruction  possible flexor digitorum longus tendon transfer  left medial slide calcaneal osteotomy, cotton osteotomy CPT code 09364    Surgeon(s):  Karen Buck MD    Assistant:  * No surgical staff found *    Anesthesia: Choice    Estimated Blood Loss (mL): Minimal    Complications: None    Specimens:   * No specimens in log *    Implants:  Implant Name Type Inv.  Item Serial No.  Lot No. LRB No. Used Action   SCREW BONE L50MM DIA6.7MM THRD L18MM FT ANK TI ST SELF DRL - [id number]  SCREW BONE L50MM DIA6.7MM THRD L18MM FT ANK TI ST SELF DRL  [employer] [id number] Left 1 Implanted   SCREW INTFR L10MM DIA4MM BIOCOMPOSITE BIO-TENODESIS - [id number]  SCREW INTFR L10MM DIA4MM BIOCOMPOSITE BIO-TENODESIS  [employer] [id number] Left 1 Implanted   WEDGE ANK D20MM THK7.5MM COT TECH TI PORUS COTY 3D OPN - [id number]  WEDGE ANK D20MM THK7.5MM COT TECH TI PORUS COTY 3D OPN  [employer]WD [id number] Left 1 Implanted         Drains: * No LDAs found *    Findings:     Electronically signed by Dinorah Gutierrez MD on 1/3/2023 at 3:25 PM

## 2023-01-03 NOTE — OP NOTE
Operative Note    Patient:Melodie Jain  MRN: [id number]    Date Of Surgery: 1/3/2023    Surgeon: Hayden Hernandez MD    Assistant Surgeon: None    Pre Op Diagnosis:  Tibialis tendinitis of left lower extremity [M76.822]  Left planovalgus hindfoot and Achilles contracture  Post Op Diagnosis:   same    Procedures Performed:  Left gastrocnemius recession, 51443  Left medial displacement calcaneal osteotomy, 13592  Left posterior tibial tendon debridement with flexor digitorum longus tendon transfer, 79188  Left spring ligament reconstruction of the ankle, 85685  Left medial cuneiform opening wedge osteotomy, 57419    Implants:   Implant Name Type Inv. Item Serial No.  Lot No. LRB No. Used Action   SCREW BONE L50MM DIA6.7MM THRD L18MM FT ANK TI ST SELF DRL - [id number]  SCREW BONE L50MM DIA6.7MM THRD L18MM FT ANK TI ST SELF DRL  [employer] [id number] Left 1 Implanted   SCREW INTFR L10MM DIA4MM BIOCOMPOSITE BIO-TENODESIS - [id number]  SCREW INTFR L10MM DIA4MM BIOCOMPOSITE BIO-TENODESIS  [employer] [id number] Left 1 Implanted   WEDGE ANK D20MM THK7.5MM COT TECH TI PORUS COTY 3D OPN - [id number]  WEDGE ANK D20MM THK7.5MM COT TECH TI PORUS COTY 3D OPN  [employer] [id number] Left 1 Implanted       Anesthesia:  Choice    Blood Loss:  minimal    Tourniquet:  Estimated thigh 45 minutes    Pre Operative Abx:   Ancef 2g            Pre Operative Course:  Cindy Smith is a 75 y.o. female who has a history of left posterior tibial tendon insufficiency. Operation In Detail:  Patient was evaluated in the preoperative area. We had a long discussion about the procedure and postoperative protocols. The patient was then brought back to the operating room suite and placed in the operating room table. A timeout was taken to identify the patient, procedure being performed, and laterality.   After this the patient was prepped and draped in the normal sterile fashion using a Betadine solution and/or a ChloraPrep solution. A timeout was then taken to identify the patient his name, date of birth, laterality, and procedure being performed. We also identified allergies and any concerns about the operation. Attention was then placed to the operative extremity. A block was placed by the department of anesthesia. In a preop surgical timeout the left lower extremity was identified as correct surgical site and prepped and draped in a standard sterile fashion using ChloraPrep solution. A gastrocnemius recession was performed or direct posterior approach while taking care to pad the sural nerve. A lateral approach to the calcaneus was then opened again taking care to protect the sural nerve. A calcaneal osteotomy was performed with the tuberosity fragment fragment shifted 1 cm medially and secured using a [employer] 6.5 millimeter screw. Wounds and irrigated and closed using Monocryl nylon sutures. A medial approach to the posterior tibial tendon was then opened. Posterior tibial tendon was noted to have significant degeneration and was debrided off of the navicular. The FDL tendon was identified and harvested in the midfoot at the knot of Erin Garcia and brought down a drill in the navicular and secured using interference screw. Side to side tenodesis with advancement of the posterior tibial tendon was performed with nonabsorbable sutures. The spring ligament was also repaired with nonabsorbable sutures. The wound was then irrigated and closed using Monocryl and nylon sutures. The patient had residual fixed forefoot varus therefore a medial cuneiform opening wedge opening wedge osteotomy was performed through the door separate dorsal approach. A biasing wedge was then placed into the osteotomy with good purchase noted. Wounds and irrigated and closed using Monocryl nylon sutures. A sterile dressing was then applied followed by well-padded posterior splint. Anesthesia was discontinued.   The patient was transferred back to recovery bed. She was taken recovery in satisfactory condition. She appeared tolerate the procedure well. There were no apparent surgical or anesthetic complications. All needle and sponge counts were correct. A sterile dressing was then applied to the leg and Posterior slab splint. They were awoken from anesthesia and returned to the PACU without difficulty.     Post Operative Plan:   1- WB status: Non-Weight Bearing   2- Immobilization/assistive devices: crutches  3- DVT px: ASA 81 mg BID

## 2023-01-03 NOTE — ANESTHESIA POSTPROCEDURE EVALUATION
Department of Anesthesiology  Postprocedure Note    Patient: Hilda Ortiz  MRN: [id number]  YOB: 1955  Date of evaluation: 1/3/2023      Procedure Summary     Date: 01/03/23 Room / Location: D OP OR 06 / [facility]    Anesthesia Start: 1407 Anesthesia Stop: 1539    Procedures:       left spring ligament reconstruction (Left: Ankle)      posterior tibial tendon reconstruction (Left: Ankle)      possible flexor digitorum longus tendon transfer (Left: Ankle)      left medial slide calcaneal osteotomy, cotton osteotomy CPT code 05246 (Left: Ankle) Diagnosis:       Tibialis tendinitis of left lower extremity      (Tibialis tendinitis of left lower extremity [X26.667])    Surgeons: Monica Corea MD Responsible Provider: Leighann Singh MD    Anesthesia Type: TIVA ASA Status: 2          Anesthesia Type: TIVA    Betzaida Phase I: Betzaida Score: 9    Betzaida Phase II: Betzaida Score: 10      Anesthesia Post Evaluation    Patient location during evaluation: PACU  Patient participation: complete - patient participated  Level of consciousness: awake  Airway patency: patent  Nausea & Vomiting: no nausea  Complications: no  Cardiovascular status: hemodynamically stable  Respiratory status: acceptable and nonlabored ventilation  Hydration status: stable  Multimodal analgesia pain management approach

## 2023-01-04 ENCOUNTER — TELEPHONE (OUTPATIENT)
Dept: ORTHOPEDIC SURGERY | Age: 68
End: 2023-01-04

## 2023-01-05 DIAGNOSIS — Z87.898 HISTORY OF POSTOPERATIVE NAUSEA: Primary | ICD-10-CM

## 2023-01-05 RX ORDER — PROMETHAZINE HYDROCHLORIDE 25 MG/1
25 TABLET ORAL 4 TIMES DAILY PRN
Qty: 20 TABLET | Refills: 0 | Status: SHIPPED | OUTPATIENT
Start: 2023-01-05 | End: 2023-01-12

## 2023-01-05 NOTE — TELEPHONE ENCOUNTER
Spoke with pt she has feeling now that the block is wearing off but is requesting nausea med to help when taking pain meds.  I will send that request to [facility] pt is aware to check with pharmacy today

## 2023-01-18 ENCOUNTER — OFFICE VISIT (OUTPATIENT)
Dept: ORTHOPEDIC SURGERY | Age: 68
End: 2023-01-18
Payer: MEDICARE

## 2023-01-18 DIAGNOSIS — Q74.2 ACCESSORY NAVICULAR BONE OF LEFT FOOT: ICD-10-CM

## 2023-01-18 DIAGNOSIS — M76.822 TIBIALIS TENDINITIS OF LEFT LOWER EXTREMITY: Primary | ICD-10-CM

## 2023-01-18 PROCEDURE — 99024 POSTOP FOLLOW-UP VISIT: CPT | Performed by: NURSE PRACTITIONER

## 2023-01-18 PROCEDURE — 29405 APPL SHORT LEG CAST: CPT | Performed by: NURSE PRACTITIONER

## 2023-01-18 NOTE — PROGRESS NOTES
Name: Melodie Yip  YOB: 1955  Gender: female  MRN: [id number]    Procedure Performed:  Left gastrocnemius recession  Left medial displacement calcaneal osteotomy  Left posterior tibial tendon debridement with flexor digitorum longus tendon transfer  Left spring ligament reconstruction of the ankle  Left medial cuneiform opening wedge osteotomy        Date of Procedure: 01/03/2023      Subjective: Patient feels like she is doing well during this nonweightbearing phase of recovery.  She does report that she fell off her scooter about a week ago and note that she still has some discomfort at the point of impact from falling on the right side.      Physical Examination: Patient's incisions look good and are continuing to heal well without any signs of infection.  Her skin is warm dry and she is palpable pulses and intact sensation to the foot.  She has no signs of DVT at this time, denies any calf or behind the knee pain presents with a negative Homans' sign.  She has mild bruising throughout the dorsal aspect of the foot as well as plantarly through the arch.  Minimal range of motion was able to be performed today due to stiffness and guarding from the patient.        Imaging:   No imaging reviewed          Assessment:   Status post left flatfoot reconstruction with spring ligament and PTT reconstruction with cotton osteotomy and gastrocnemius recession.  We discussed progression of care today as well as expectations until her next follow-up visit.      Plan:   3 This is stable chronic illness/condition  Treatment at this time:  Sutures removed, Steri-Strips and short leg cast replaced today.  Patient will continue be nonweightbearing on the affected extremity at this time.  She will continue take daily aspirin as DVT prophylaxis.  She is now allowed to get the cast wet.  She will continue to elevate the affected extremity for swelling.  She will follow-up in 3 weeks or sooner if needed for  cast off x-ray. Studies ordered:  Foot XR needed @ Next Visit    Weight-bearing status: NWB        Return to work/work restrictions: none  No medications given

## 2023-02-06 ENCOUNTER — OFFICE VISIT (OUTPATIENT)
Dept: ORTHOPEDIC SURGERY | Age: 68
End: 2023-02-06

## 2023-02-06 DIAGNOSIS — M76.822 TIBIALIS TENDINITIS OF LEFT LOWER EXTREMITY: ICD-10-CM

## 2023-02-06 DIAGNOSIS — Q74.2 ACCESSORY NAVICULAR BONE OF LEFT FOOT: Primary | ICD-10-CM

## 2023-02-06 NOTE — PROGRESS NOTES
Name: Luis Enrique Yung  YOB: 1955  Gender: female  MRN: [id number]    Procedure Performed:  Left gastrocnemius recession  Left medial displacement calcaneal osteotomy  Left posterior tibial tendon debridement with flexor digitorum longus tendon transfer  Left spring ligament reconstruction of the ankle  Left medial cuneiform opening wedge osteotomy           Date of Procedure: 01/03/2023      Subjective: Patient is overall pleased with the appearance of her foot. She is very happy she now has an arch. She is ready to progress with her recovery. Physical Examination: Patient's incisions appear to be well-healed, Steri-Strips are still in place after cast removal today. There were no signs of infection to the foot. She has pretty significant swelling to the dorsal aspect of her foot, +2 edema is present. She has no signs of DVT at this time, denies any calf pain or behind the knee pain and presents with a negative Lyndle Bullion' sign. She has no pain to the heel plantarly with palpation. There is no issues with range of motion to the midfoot. There is the presence of the arch in her foot at today's visit. Imaging:   I independently interpreted XR taken today  Left foot XR: AP, Lateral, Oblique views     ICD-10-CM    1. Accessory navicular bone of left foot  Q74.2 XR FOOT LEFT (MIN 3 VIEWS)      2. Tibialis tendinitis of left lower extremity  M76.822 XR FOOT LEFT (MIN 3 VIEWS)         Report: AP, lateral, oblique x-ray of the left foot demonstrates healing osteotomies without hardware failure    Impression:  Osteotomies without hardware failure   Donald Salcido, APRN - CNP           Assessment:   Status post flatfoot reconstruction.       Plan:   3 This is stable chronic illness/condition  Treatment at this time:  Cast was removed today, patient was placed to a cam walker boot as a matter medical necessity where she may progressively bear weight on the affected extremity as she can tolerate and swelling allows. She was encouraged to continue elevating the affected extremity for swelling. She will continue limit her activity levels at this time. She may now get the affected extremity wet including showering soaking as needed, recommendation Epsom salts given to help with additional incisional healing as well as swelling purposes. She was given information regarding even up sole for the unaffected extremity to wear to help with gait and balance issues while in the boot. She may start open chain range of motion exercises when not in the boot. She will follow-up in 5 weeks or sooner if needed with x-ray. Studies ordered:  Foot XR needed @ Next Visit    Weight-bearing status: WBAT in Boot/hardsole shoe        Return to work/work restrictions: none  No medications given

## 2023-02-06 NOTE — PROGRESS NOTES
The patient was prescribed a walker boot for the patient's left foot. The patient wears a size 8 shoe and I fitted them with a M size boot. The patient was fitted and instructed on the use of prescribed walker boot. I explained how to fit themselves and that the plastic flexible piece should always be on the front of the boot and secured by the Velcro straps on top. The air bladder in the boot was adjusted according to proper fit and comfort. The patient walked a short distance and acknowledged satisfaction with current fit. I also explained that they need a heel lift or a higher heeled shoe for the uninvolved LE to help normalize gait and avoid excessive low back stress/strain due to leg length inequality created from walker boot. Patient read and signed documenting they understand and agree to [facility]'s current DME return policy.

## 2023-03-13 ENCOUNTER — OFFICE VISIT (OUTPATIENT)
Dept: ORTHOPEDIC SURGERY | Age: 68
End: 2023-03-13

## 2023-03-13 DIAGNOSIS — Q74.2 ACCESSORY NAVICULAR BONE OF LEFT FOOT: ICD-10-CM

## 2023-03-13 DIAGNOSIS — M76.822 TIBIALIS TENDINITIS OF LEFT LOWER EXTREMITY: Primary | ICD-10-CM

## 2023-03-13 PROCEDURE — 99024 POSTOP FOLLOW-UP VISIT: CPT | Performed by: NURSE PRACTITIONER

## 2023-03-13 NOTE — PROGRESS NOTES
Name: Jagdish Butcher  YOB: 1955  Gender: female  MRN: [id number]    Procedure Performed:  Left gastrocnemius recession  Left medial displacement calcaneal osteotomy  Left posterior tibial tendon debridement with flexor digitorum longus tendon transfer  Left spring ligament reconstruction of the ankle  Left medial cuneiform opening wedge osteotomy           Date of Procedure: 01/03/2023    Subjective: Patient continues to do well postoperatively. She does report that she has some residual nerve related sensations including feeling of restrictions in between and around the toes. Physical Examination: All incisional areas are well-healed there are no signs of infection. She has palpable pulses and intact sensation of the foot. Active and passive range of motion to the ankle was performed. Patient has no issues with inversion and eversion of the foot, just overall stiff with movements. She is able to do a double leg toe raise. She still has moderate swelling throughout the entire foot at all incisional areas. She has the appearance of an arch in the previous plantar valgus foot. Imaging:   I independently interpreted XR taken today  Left foot XR: AP, Lateral, Oblique views     ICD-10-CM    1. Accessory navicular bone of left foot  Q74.2 XR FOOT LEFT (MIN 3 VIEWS)      2. Tibialis tendinitis of left lower extremity  M76.822 XR FOOT LEFT (MIN 3 VIEWS)         Report: AP, lateral, oblique x-ray of the left foot demonstrates healing osteotomy without hardware failure    Impression: Healing osteotomy without hardware failure   RACHELLE Perdomo - CNP           Assessment:   Status post flatfoot reconstruction. Plan:   3 This is stable chronic illness/condition  Treatment at this time:  Patient may begin to wean from cam walker boot back to comfortable shoes that she can tolerate and swelling allows.   She was encouraged to continue elevating the affected extremity as needed for swelling. She may resume physical activities at this time excluding high-impact. She will begin physical therapy, an order was given today as well as a list of locations for her to choose from. She will also begin a diligent home exercise program to increase strength mobility of the affected foot and ankle. She will use a lace up ankle brace for strenuous activities. She will follow-up in 3 months or sooner if needed with x-ray. Studies ordered:  Foot XR needed @ Next Visit    Weight-bearing status: WBAT        Return to work/work restrictions: none  No medications given

## 2023-05-19 ENCOUNTER — TELEPHONE (OUTPATIENT)
Dept: ORTHOPEDIC SURGERY | Age: 68
End: 2023-05-19

## 2023-06-06 ENCOUNTER — OFFICE VISIT (OUTPATIENT)
Dept: ORTHOPEDIC SURGERY | Age: 68
End: 2023-06-06
Payer: MEDICARE

## 2023-06-06 DIAGNOSIS — M76.822 TIBIALIS TENDINITIS OF LEFT LOWER EXTREMITY: Primary | ICD-10-CM

## 2023-06-06 PROCEDURE — 1036F TOBACCO NON-USER: CPT | Performed by: NURSE PRACTITIONER

## 2023-06-06 PROCEDURE — 1123F ACP DISCUSS/DSCN MKR DOCD: CPT | Performed by: NURSE PRACTITIONER

## 2023-06-06 PROCEDURE — G8427 DOCREV CUR MEDS BY ELIG CLIN: HCPCS | Performed by: NURSE PRACTITIONER

## 2023-06-06 PROCEDURE — G8399 PT W/DXA RESULTS DOCUMENT: HCPCS | Performed by: NURSE PRACTITIONER

## 2023-06-06 PROCEDURE — 1090F PRES/ABSN URINE INCON ASSESS: CPT | Performed by: NURSE PRACTITIONER

## 2023-06-06 PROCEDURE — 3017F COLORECTAL CA SCREEN DOC REV: CPT | Performed by: NURSE PRACTITIONER

## 2023-06-06 PROCEDURE — 99213 OFFICE O/P EST LOW 20 MIN: CPT | Performed by: NURSE PRACTITIONER

## 2023-06-06 PROCEDURE — G8417 CALC BMI ABV UP PARAM F/U: HCPCS | Performed by: NURSE PRACTITIONER

## 2023-06-06 NOTE — PROGRESS NOTES
wearing tennis shoes. She will follow-up with us on an as-needed basis.   Studies ordered: NO XR needed @ Next Visit    Weight-bearing status: WBAT        Return to work/work restrictions: none  No medications given

## 2023-08-09 ENCOUNTER — TRANSCRIBE ORDERS (OUTPATIENT)
Dept: SCHEDULING | Age: 68
End: 2023-08-09

## 2023-08-09 DIAGNOSIS — Z12.31 SCREENING MAMMOGRAM FOR HIGH-RISK PATIENT: Primary | ICD-10-CM

## 2023-09-23 ENCOUNTER — HOSPITAL ENCOUNTER (OUTPATIENT)
Dept: MAMMOGRAPHY | Age: 68
End: 2023-09-23
Payer: MEDICARE

## 2023-09-23 DIAGNOSIS — Z12.31 SCREENING MAMMOGRAM FOR HIGH-RISK PATIENT: ICD-10-CM

## 2023-09-23 PROCEDURE — 77067 SCR MAMMO BI INCL CAD: CPT

## 2023-10-25 ENCOUNTER — OFFICE VISIT (OUTPATIENT)
Dept: ORTHOPEDIC SURGERY | Age: 68
End: 2023-10-25

## 2023-10-25 DIAGNOSIS — M72.2 PLANTAR FASCIITIS, LEFT: ICD-10-CM

## 2023-10-25 DIAGNOSIS — M76.822 TIBIALIS TENDINITIS OF LEFT LOWER EXTREMITY: Primary | ICD-10-CM

## 2023-10-25 NOTE — PROGRESS NOTES
foot demonstrates no hardware failure or fracture    Impression: No hardware failure or fracture   Mike King III, MD           Assessment:   Plantar fasciitis    Treatment Plan:   4 This is a chronic illness/condition with exacerbation and progression  Treatment at this time: Physical Therapy and Bracing: Placed in: night splint  Studies ordered: NO XR needed @ Next Visit    Weight-bearing status: WBAT        Return to work/work restrictions: none  No medications given    She is can to try an off-the-shelf night splint as well as a home exercise program provided today and return as needed.

## 2024-03-18 ENCOUNTER — OFFICE VISIT (OUTPATIENT)
Dept: ORTHOPEDIC SURGERY | Age: 69
End: 2024-03-18
Payer: MEDICARE

## 2024-03-18 DIAGNOSIS — M72.2 PLANTAR FASCIITIS, LEFT: ICD-10-CM

## 2024-03-18 DIAGNOSIS — M76.822 POSTERIOR TIBIAL TENDINITIS, LEFT LEG: ICD-10-CM

## 2024-03-18 DIAGNOSIS — Q74.2 ACCESSORY NAVICULAR BONE OF LEFT FOOT: ICD-10-CM

## 2024-03-18 DIAGNOSIS — M76.822 TIBIALIS TENDINITIS OF LEFT LOWER EXTREMITY: Primary | ICD-10-CM

## 2024-03-18 PROCEDURE — 1090F PRES/ABSN URINE INCON ASSESS: CPT | Performed by: ORTHOPAEDIC SURGERY

## 2024-03-18 PROCEDURE — 1123F ACP DISCUSS/DSCN MKR DOCD: CPT | Performed by: ORTHOPAEDIC SURGERY

## 2024-03-18 PROCEDURE — 3017F COLORECTAL CA SCREEN DOC REV: CPT | Performed by: ORTHOPAEDIC SURGERY

## 2024-03-18 PROCEDURE — G8421 BMI NOT CALCULATED: HCPCS | Performed by: ORTHOPAEDIC SURGERY

## 2024-03-18 PROCEDURE — 99214 OFFICE O/P EST MOD 30 MIN: CPT | Performed by: ORTHOPAEDIC SURGERY

## 2024-03-18 PROCEDURE — G8399 PT W/DXA RESULTS DOCUMENT: HCPCS | Performed by: ORTHOPAEDIC SURGERY

## 2024-03-18 PROCEDURE — G8428 CUR MEDS NOT DOCUMENT: HCPCS | Performed by: ORTHOPAEDIC SURGERY

## 2024-03-18 PROCEDURE — G8484 FLU IMMUNIZE NO ADMIN: HCPCS | Performed by: ORTHOPAEDIC SURGERY

## 2024-03-18 PROCEDURE — 1036F TOBACCO NON-USER: CPT | Performed by: ORTHOPAEDIC SURGERY

## 2024-03-18 NOTE — PROGRESS NOTES
Name: Melodie Yip  YOB: 1955  Gender: female  MRN: [id number]    Summary:     Left midfoot pain status post flatfoot reconstruction     CC: Follow-up (Recheck, Left foot, DOS: 1/3/23/Left gastrocnemius recession/Left medial displacement calcaneal osteotomy/Left posterior tibial tendon debridement with flexor digitorum longus tendon transfer/Left spring ligament reconstruction of the ankle/Left medial cuneiform opening wedge osteotomy/)       HPI: Melodie Yip is a 69 y.o. female who presents with Follow-up (Recheck, Left foot, DOS: 1/3/23/Left gastrocnemius recession/Left medial displacement calcaneal osteotomy/Left posterior tibial tendon debridement with flexor digitorum longus tendon transfer/Left spring ligament reconstruction of the ankle/Left medial cuneiform opening wedge osteotomy/)  .  This patient presents back the office today about a year and several months out from a left flatfoot reconstruction.  She is got her strength back significantly but her main problem now is midfoot pain with heavier activity.    History was obtained by Patient     ROS/Meds/PSH/PMH/FH/SH: I personally reviewed the patients standard intake form.  Below are the pertinents    Tobacco:  reports that she has never smoked. She has never used smokeless tobacco.  Diabetes: None      Physical Examination:    Exam of the left foot and ankle shows well-healed surgical incisions with very little swelling.  She has wonderful range of motion of the foot and ankle.  She does not have tenderness over the calcaneal screw.  She does have tenderness in the midfoot area which is mild in nature.    Imaging:   Interpretation of imaging  Left foot XR: AP, Lateral, Oblique views     ICD-10-CM    1. Tibialis tendinitis of left lower extremity  M76.822 XR FOOT LEFT (MIN 3 VIEWS)      2. Plantar fasciitis, left  M72.2 XR FOOT LEFT (MIN 3 VIEWS)      3. Accessory navicular bone of left foot  Q74.2 XR FOOT LEFT (MIN 3

## 2024-03-22 ENCOUNTER — HOSPITAL ENCOUNTER (OUTPATIENT)
Dept: CT IMAGING | Age: 69
End: 2024-03-22
Attending: ORTHOPAEDIC SURGERY
Payer: MEDICARE

## 2024-03-22 DIAGNOSIS — M76.822 POSTERIOR TIBIAL TENDINITIS, LEFT LEG: ICD-10-CM

## 2024-03-22 DIAGNOSIS — M72.2 PLANTAR FASCIITIS, LEFT: ICD-10-CM

## 2024-03-22 DIAGNOSIS — Q74.2 ACCESSORY NAVICULAR BONE OF LEFT FOOT: ICD-10-CM

## 2024-03-22 DIAGNOSIS — M76.822 TIBIALIS TENDINITIS OF LEFT LOWER EXTREMITY: ICD-10-CM

## 2024-03-22 PROCEDURE — 73700 CT LOWER EXTREMITY W/O DYE: CPT

## 2024-04-02 ENCOUNTER — TELEPHONE (OUTPATIENT)
Dept: ORTHOPEDIC SURGERY | Age: 69
End: 2024-04-02

## 2024-04-02 RX ORDER — MELOXICAM 15 MG/1
15 TABLET ORAL DAILY
Qty: 30 TABLET | Refills: 3 | Status: SHIPPED | OUTPATIENT
Start: 2024-04-02

## 2024-04-02 NOTE — TELEPHONE ENCOUNTER
CT scan reviewed which shows complete healing of all osteotomies.  We discussed this may be submitted for arthritis.  We can send in some meloxicam for her.  She can return as needed.

## 2024-04-24 NOTE — PERIOP NOTE
Patient verified name and .  Order for consent not found in EHR patient verifies procedure.   Type 1B surgery, Phone assessment complete.  Orders not received.  Labs per surgeon: none  Labs per anesthesia protocol: none    Patient answered medical/surgical history questions at their best of ability. All prior to admission medications documented in EPIC.    Patient instructed to continue taking all prescription medications up to the day of surgery but to take only the following medications the day of surgery according to anesthesia guidelines with a small sip of water: verapamil (Calan) Also, patient is requested to take 2 Tylenol in the morning and then again before bed on the day before surgery. Regular or extra strength may be used.       Patient informed that all vitamins and supplements should be held 7 days prior to surgery and NSAIDS 5 days prior to surgery. Prescription meds to hold:none    Patient instructed on the following:    > Arrive at [facility] Entrance, time of arrival to be called the day before by 1700  > NPO after midnight, unless otherwise indicated, including gum, mints, and ice chips  > Responsible adult must drive patient to the hospital, stay during surgery, and patient will need supervision 24 hours after anesthesia  > Use non moisturizing soap in shower the night before surgery and on the morning of surgery  > All piercings must be removed prior to arrival.    > Leave all valuables (money and jewelry) at home but bring insurance card and ID on DOS.   > You may be required to pay a deductible or co-pay on the day of your procedure. You can pre-pay by calling [phone] if your surgery is at the [facility] or [phone] if your surgery is at the [facility].  > Do not wear make-up, nail polish, lotions, cologne, perfumes, powders, or oil on skin. Artificial nails are not permitted.

## 2024-04-26 NOTE — PERIOP NOTE
Preop department called to notify patient of arrival time for scheduled procedure. Instructions given to   - Arrive at OPC Entrance [address].  - Remain NPO after midnight, unless otherwise indicated, including gum, mints, and ice chips.   - Have a responsible adult to drive patient to the hospital, stay during surgery, and patient will need supervision 24 hours after anesthesia.   - Use antibacterial soap in shower the night before surgery and on the morning of surgery.       Was patient contacted: yes  Voicemail left:   Numbers contacted: [phone]   Arrival time: 1030

## 2024-04-28 ENCOUNTER — ANESTHESIA EVENT (OUTPATIENT)
Dept: SURGERY | Age: 69
End: 2024-04-28
Payer: MEDICARE

## 2024-04-29 ENCOUNTER — HOSPITAL ENCOUNTER (OUTPATIENT)
Age: 69
Setting detail: OUTPATIENT SURGERY
Discharge: HOME | End: 2024-04-29
Attending: OTOLARYNGOLOGY | Admitting: OTOLARYNGOLOGY
Payer: MEDICARE

## 2024-04-29 ENCOUNTER — ANESTHESIA (OUTPATIENT)
Dept: SURGERY | Age: 69
End: 2024-04-29
Payer: MEDICARE

## 2024-04-29 VITALS
DIASTOLIC BLOOD PRESSURE: 82 MMHG | OXYGEN SATURATION: 96 % | WEIGHT: 190 LBS | HEART RATE: 66 BPM | RESPIRATION RATE: 39 BRPM | TEMPERATURE: 97 F | BODY MASS INDEX: 31.65 KG/M2 | SYSTOLIC BLOOD PRESSURE: 176 MMHG | HEIGHT: 65 IN

## 2024-04-29 LAB — POTASSIUM BLD-SCNC: 3.5 MMOL/L (ref 3.5–5.1)

## 2024-04-29 PROCEDURE — 7100000010 HC PHASE II RECOVERY - FIRST 15 MIN: Performed by: OTOLARYNGOLOGY

## 2024-04-29 PROCEDURE — 6360000002 HC RX W HCPCS: Performed by: ANESTHESIOLOGY

## 2024-04-29 PROCEDURE — 84132 ASSAY OF SERUM POTASSIUM: CPT

## 2024-04-29 PROCEDURE — 3700000001 HC ADD 15 MINUTES (ANESTHESIA): Performed by: OTOLARYNGOLOGY

## 2024-04-29 PROCEDURE — 6370000000 HC RX 637 (ALT 250 FOR IP): Performed by: ANESTHESIOLOGY

## 2024-04-29 PROCEDURE — 2580000003 HC RX 258: Performed by: ANESTHESIOLOGY

## 2024-04-29 PROCEDURE — 3600000014 HC SURGERY LEVEL 4 ADDTL 15MIN: Performed by: OTOLARYNGOLOGY

## 2024-04-29 PROCEDURE — 7100000011 HC PHASE II RECOVERY - ADDTL 15 MIN: Performed by: OTOLARYNGOLOGY

## 2024-04-29 PROCEDURE — 6360000002 HC RX W HCPCS: Performed by: REGISTERED NURSE

## 2024-04-29 PROCEDURE — 3600000004 HC SURGERY LEVEL 4 BASE: Performed by: OTOLARYNGOLOGY

## 2024-04-29 PROCEDURE — 6370000000 HC RX 637 (ALT 250 FOR IP): Performed by: OTOLARYNGOLOGY

## 2024-04-29 PROCEDURE — 2720000010 HC SURG SUPPLY STERILE: Performed by: OTOLARYNGOLOGY

## 2024-04-29 PROCEDURE — 2709999900 HC NON-CHARGEABLE SUPPLY: Performed by: OTOLARYNGOLOGY

## 2024-04-29 PROCEDURE — 3700000000 HC ANESTHESIA ATTENDED CARE: Performed by: OTOLARYNGOLOGY

## 2024-04-29 PROCEDURE — 7100000000 HC PACU RECOVERY - FIRST 15 MIN: Performed by: OTOLARYNGOLOGY

## 2024-04-29 PROCEDURE — 2500000003 HC RX 250 WO HCPCS: Performed by: REGISTERED NURSE

## 2024-04-29 PROCEDURE — 7100000001 HC PACU RECOVERY - ADDTL 15 MIN: Performed by: OTOLARYNGOLOGY

## 2024-04-29 PROCEDURE — 2500000003 HC RX 250 WO HCPCS: Performed by: OTOLARYNGOLOGY

## 2024-04-29 RX ORDER — LIDOCAINE HYDROCHLORIDE AND EPINEPHRINE 10; 10 MG/ML; UG/ML
INJECTION, SOLUTION INFILTRATION; PERINEURAL PRN
Status: DISCONTINUED | OUTPATIENT
Start: 2024-04-29 | End: 2024-04-29 | Stop reason: ALTCHOICE

## 2024-04-29 RX ORDER — DEXAMETHASONE SODIUM PHOSPHATE 4 MG/ML
INJECTION, SOLUTION INTRA-ARTICULAR; INTRALESIONAL; INTRAMUSCULAR; INTRAVENOUS; SOFT TISSUE PRN
Status: DISCONTINUED | OUTPATIENT
Start: 2024-04-29 | End: 2024-04-29 | Stop reason: SDUPTHER

## 2024-04-29 RX ORDER — PROPOFOL 10 MG/ML
INJECTION, EMULSION INTRAVENOUS PRN
Status: DISCONTINUED | OUTPATIENT
Start: 2024-04-29 | End: 2024-04-29 | Stop reason: SDUPTHER

## 2024-04-29 RX ORDER — IPRATROPIUM BROMIDE AND ALBUTEROL SULFATE 2.5; .5 MG/3ML; MG/3ML
1 SOLUTION RESPIRATORY (INHALATION)
Status: DISCONTINUED | OUTPATIENT
Start: 2024-04-29 | End: 2024-04-29 | Stop reason: HOSPADM

## 2024-04-29 RX ORDER — ACETAMINOPHEN 500 MG
1000 TABLET ORAL ONCE
Status: COMPLETED | OUTPATIENT
Start: 2024-04-29 | End: 2024-04-29

## 2024-04-29 RX ORDER — ONDANSETRON 2 MG/ML
INJECTION INTRAMUSCULAR; INTRAVENOUS PRN
Status: DISCONTINUED | OUTPATIENT
Start: 2024-04-29 | End: 2024-04-29 | Stop reason: SDUPTHER

## 2024-04-29 RX ORDER — SODIUM CHLORIDE 0.9 % (FLUSH) 0.9 %
5-40 SYRINGE (ML) INJECTION EVERY 12 HOURS SCHEDULED
Status: DISCONTINUED | OUTPATIENT
Start: 2024-04-29 | End: 2024-04-29 | Stop reason: HOSPADM

## 2024-04-29 RX ORDER — SODIUM CHLORIDE 0.9 % (FLUSH) 0.9 %
5-40 SYRINGE (ML) INJECTION PRN
Status: DISCONTINUED | OUTPATIENT
Start: 2024-04-29 | End: 2024-04-29 | Stop reason: HOSPADM

## 2024-04-29 RX ORDER — SUCCINYLCHOLINE CHLORIDE 20 MG/ML
INJECTION INTRAMUSCULAR; INTRAVENOUS PRN
Status: DISCONTINUED | OUTPATIENT
Start: 2024-04-29 | End: 2024-04-29 | Stop reason: SDUPTHER

## 2024-04-29 RX ORDER — LIDOCAINE HYDROCHLORIDE 10 MG/ML
1 INJECTION, SOLUTION INFILTRATION; PERINEURAL
Status: DISCONTINUED | OUTPATIENT
Start: 2024-04-29 | End: 2024-04-29 | Stop reason: HOSPADM

## 2024-04-29 RX ORDER — OXYMETAZOLINE HYDROCHLORIDE 0.05 G/100ML
SPRAY NASAL PRN
Status: DISCONTINUED | OUTPATIENT
Start: 2024-04-29 | End: 2024-04-29 | Stop reason: ALTCHOICE

## 2024-04-29 RX ORDER — SODIUM CHLORIDE, SODIUM LACTATE, POTASSIUM CHLORIDE, CALCIUM CHLORIDE 600; 310; 30; 20 MG/100ML; MG/100ML; MG/100ML; MG/100ML
INJECTION, SOLUTION INTRAVENOUS CONTINUOUS
Status: DISCONTINUED | OUTPATIENT
Start: 2024-04-29 | End: 2024-04-29 | Stop reason: HOSPADM

## 2024-04-29 RX ORDER — ROCURONIUM BROMIDE 10 MG/ML
INJECTION, SOLUTION INTRAVENOUS PRN
Status: DISCONTINUED | OUTPATIENT
Start: 2024-04-29 | End: 2024-04-29 | Stop reason: SDUPTHER

## 2024-04-29 RX ORDER — NALOXONE HYDROCHLORIDE 0.4 MG/ML
INJECTION, SOLUTION INTRAMUSCULAR; INTRAVENOUS; SUBCUTANEOUS PRN
Status: DISCONTINUED | OUTPATIENT
Start: 2024-04-29 | End: 2024-04-29 | Stop reason: HOSPADM

## 2024-04-29 RX ORDER — MIDAZOLAM HYDROCHLORIDE 2 MG/2ML
2 INJECTION, SOLUTION INTRAMUSCULAR; INTRAVENOUS
Status: DISCONTINUED | OUTPATIENT
Start: 2024-04-29 | End: 2024-04-29 | Stop reason: HOSPADM

## 2024-04-29 RX ORDER — HYDROMORPHONE HYDROCHLORIDE 2 MG/ML
0.5 INJECTION, SOLUTION INTRAMUSCULAR; INTRAVENOUS; SUBCUTANEOUS EVERY 10 MIN PRN
Status: DISCONTINUED | OUTPATIENT
Start: 2024-04-29 | End: 2024-04-29 | Stop reason: HOSPADM

## 2024-04-29 RX ORDER — ONDANSETRON 2 MG/ML
4 INJECTION INTRAMUSCULAR; INTRAVENOUS
Status: COMPLETED | OUTPATIENT
Start: 2024-04-29 | End: 2024-04-29

## 2024-04-29 RX ORDER — HALOPERIDOL 5 MG/ML
1 INJECTION INTRAMUSCULAR
Status: DISCONTINUED | OUTPATIENT
Start: 2024-04-29 | End: 2024-04-29 | Stop reason: HOSPADM

## 2024-04-29 RX ORDER — LIDOCAINE HYDROCHLORIDE 20 MG/ML
INJECTION, SOLUTION EPIDURAL; INFILTRATION; INTRACAUDAL; PERINEURAL PRN
Status: DISCONTINUED | OUTPATIENT
Start: 2024-04-29 | End: 2024-04-29 | Stop reason: SDUPTHER

## 2024-04-29 RX ORDER — OXYCODONE HYDROCHLORIDE 5 MG/1
5 TABLET ORAL
Status: DISCONTINUED | OUTPATIENT
Start: 2024-04-29 | End: 2024-04-29 | Stop reason: HOSPADM

## 2024-04-29 RX ORDER — FENTANYL CITRATE 50 UG/ML
50 INJECTION, SOLUTION INTRAMUSCULAR; INTRAVENOUS EVERY 5 MIN PRN
Status: DISCONTINUED | OUTPATIENT
Start: 2024-04-29 | End: 2024-04-29 | Stop reason: HOSPADM

## 2024-04-29 RX ADMIN — ACETAMINOPHEN 1000 MG: 500 TABLET, FILM COATED ORAL at 10:45

## 2024-04-29 RX ADMIN — HYDROMORPHONE HYDROCHLORIDE 0.5 MG: 2 INJECTION INTRAMUSCULAR; INTRAVENOUS; SUBCUTANEOUS at 13:35

## 2024-04-29 RX ADMIN — SODIUM CHLORIDE, POTASSIUM CHLORIDE, SODIUM LACTATE AND CALCIUM CHLORIDE: 600; 310; 30; 20 INJECTION, SOLUTION INTRAVENOUS at 10:45

## 2024-04-29 RX ADMIN — ROCURONIUM BROMIDE 5 MG: 50 INJECTION, SOLUTION INTRAVENOUS at 12:45

## 2024-04-29 RX ADMIN — ONDANSETRON 4 MG: 2 INJECTION INTRAMUSCULAR; INTRAVENOUS at 14:34

## 2024-04-29 RX ADMIN — Medication 120 MG: at 12:46

## 2024-04-29 RX ADMIN — LIDOCAINE HYDROCHLORIDE 100 MG: 20 INJECTION, SOLUTION EPIDURAL; INFILTRATION; INTRACAUDAL; PERINEURAL at 12:45

## 2024-04-29 RX ADMIN — FENTANYL CITRATE 100 MCG: 50 INJECTION INTRAMUSCULAR; INTRAVENOUS at 12:45

## 2024-04-29 RX ADMIN — ONDANSETRON 4 MG: 2 INJECTION INTRAMUSCULAR; INTRAVENOUS at 12:53

## 2024-04-29 RX ADMIN — PROPOFOL 200 MG: 10 INJECTION, EMULSION INTRAVENOUS at 12:45

## 2024-04-29 RX ADMIN — DEXAMETHASONE SODIUM PHOSPHATE 10 MG: 4 INJECTION, SOLUTION INTRAMUSCULAR; INTRAVENOUS at 12:53

## 2024-04-29 RX ADMIN — PROPOFOL 30 MG: 10 INJECTION, EMULSION INTRAVENOUS at 12:54

## 2024-04-29 RX ADMIN — HYDROMORPHONE HYDROCHLORIDE 0.5 MG: 2 INJECTION INTRAMUSCULAR; INTRAVENOUS; SUBCUTANEOUS at 13:40

## 2024-04-29 ASSESSMENT — PAIN SCALES - GENERAL
PAINLEVEL_OUTOF10: 8
PAINLEVEL_OUTOF10: 8

## 2024-04-29 ASSESSMENT — PAIN DESCRIPTION - DESCRIPTORS: DESCRIPTORS: ACHING

## 2024-04-29 ASSESSMENT — PAIN - FUNCTIONAL ASSESSMENT: PAIN_FUNCTIONAL_ASSESSMENT: 0-10

## 2024-04-29 NOTE — ANESTHESIA POSTPROCEDURE EVALUATION
Department of Anesthesiology  Postprocedure Note    Patient: Melodie Yip  MRN: [id number]  YOB: 1955  Date of evaluation: 4/29/2024    Procedure Summary       Date: 04/29/24 Room / Location: [facility] OP OR 04 / [facility]    Anesthesia Start: 1240 Anesthesia Stop: 1323    Procedures:       SEPTOPLASTY (Nose)      BILATERAL SMRITS (Bilateral: Nose) Diagnosis:       Deviated nasal septum      Hypertrophy of nasal turbinates      Nasal obstruction      (Deviated nasal septum [J34.2])      (Hypertrophy of nasal turbinates [J34.3])      (Nasal obstruction [J34.89])    Surgeons: Brian Mustafa DO Responsible Provider: Justus Mireles MD    Anesthesia Type: General ASA Status: 2            Anesthesia Type: General    Betzaida Phase I: Betzaida Score: 8    Betzaida Phase II:      Anesthesia Post Evaluation    Patient location during evaluation: PACU  Patient participation: complete - patient participated  Level of consciousness: awake and alert  Airway patency: patent  Nausea & Vomiting: no nausea and no vomiting  Cardiovascular status: hemodynamically stable  Respiratory status: acceptable, nonlabored ventilation and spontaneous ventilation  Hydration status: euvolemic  Comments: BP (!) 164/76   Pulse 55   Temp 97 °F (36.1 °C) (Skin)   Resp 18   Ht 1.651 m (5' 5\")   Wt 86.2 kg (190 lb)   SpO2 97%   BMI 31.62 kg/m²     Multimodal analgesia pain management approach  Pain management: adequate and satisfactory to patient    No notable events documented.

## 2024-04-29 NOTE — ANESTHESIA PRE PROCEDURE
midazolam PF (VERSED) injection 2 mg  2 mg IntraVENous Once PRN Justus Mireles MD       • ipratropium 0.5 mg-albuterol 2.5 mg (DUONEB) nebulizer solution 1 Dose  1 Dose Inhalation Once PRN Justus Mireles MD           Allergies:    Allergies   Allergen Reactions   • Amlodipine Swelling   • Sulfa Antibiotics Diarrhea   • Adhesive Tape Rash       Problem List:    Patient Active Problem List   Diagnosis Code   • Trochanteric bursitis of right hip M70.61   • Tibialis tendinitis of left lower extremity M76.822       Past Medical History:        Diagnosis Date   • Arthritis     right hand/ shoulder and feet   • Cancer (HCC)     melanoma- moles removed   • GERD (gastroesophageal reflux disease)     OCC MED   • Hypertension     controlled w/med   • Insomnia    • Sleep apnea     wears a mouth piece       Past Surgical History:        Procedure Laterality Date   • ANKLE SURGERY Left 01/03/2023    left spring ligament reconstruction performed by [place] III, MD at [facility]   • BREAST REDUCTION SURGERY     • BUNIONECTOMY      bilat   • FOOT TENDON SURGERY Left 01/03/2023    possible flexor digitorum longus tendon transfer performed by [place] III, MD at [facility]   • GASTROCNEMIUS RECESSION Left 01/03/2023    left medial slide calcaneal osteotomy, cotton osteotomy CPT code 71287 performed by [place] III, MD at [facility]   • HYSTERECTOMY (CERVIX STATUS UNKNOWN)     • ORTHOPEDIC SURGERY Right 2020    shoulder surg   • OSTEOTOMY Left 01/03/2023    posterior tibial tendon reconstruction performed by [place] III, MD at [facility]   • SABAS AND BSO (CERVIX REMOVED)  2006    \"and prolapse surgery for cystocele and rectocele\"   • TUBAL LIGATION         Social History:    Social History     Tobacco Use   • Smoking status: Never   • Smokeless tobacco: Never   Substance Use Topics   • Alcohol use: Yes     Alcohol/week: 2.5 standard drinks of alcohol     Comment: occ                                Counseling given: Not

## 2024-04-29 NOTE — OP NOTE
patient a widely patent nasal airway.  Strickland splints with antibiotic ointment on them were placed in each of the nose and sewn in place anteriorly with a single nylon stitch, and then the patient was awakened and taken to the postop recovery room in a stable condition.        ORLANDO MOTT DO      TSS/DOLORESS  D:  04/29/2024 13:14:48  T:  04/29/2024 14:31:00  JOB #:  [id number]

## 2024-10-02 ENCOUNTER — HOSPITAL ENCOUNTER (OUTPATIENT)
Dept: MAMMOGRAPHY | Age: 69
Discharge: HOME | End: 2024-10-05
Payer: MEDICARE

## 2024-10-02 DIAGNOSIS — Z12.39 BREAST SCREENING: ICD-10-CM

## 2024-10-02 PROCEDURE — 77063 BREAST TOMOSYNTHESIS BI: CPT

## (undated) DEVICE — GLOVE SURG SZ 65 CRM LTX FREE POLYISOPRENE POLYMER BEAD ANTI

## (undated) DEVICE — SET IRRIG W 96IN TBNG 4 LN FLX BG

## (undated) DEVICE — BANDAGE COBAN 6 IN WND 6INX5YD FOAM

## (undated) DEVICE — WEREWOLF FLOW 90 COBLATION WAND: Brand: COBLATION

## (undated) DEVICE — SUTURE NONABSORBABLE MONOFILAMENT 3-0 PS-1 18 IN BLK ETHILON 1663H

## (undated) DEVICE — SOLUTION IRRIG 1000ML 09% SOD CHL USP PIC PLAS CONTAINER

## (undated) DEVICE — DRAPE,SHOULDER,BEACH CHAIR,STERILE: Brand: MEDLINE

## (undated) DEVICE — BNDG,ELSTC,MATRIX,STRL,3"X5YD,LF,HOOK&LP: Brand: MEDLINE

## (undated) DEVICE — INTENDED FOR TISSUE SEPARATION, AND OTHER PROCEDURES THAT REQUIRE A SHARP SURGICAL BLADE TO PUNCTURE OR CUT.: Brand: BARD-PARKER ® STAINLESS STEEL BLADES

## (undated) DEVICE — [RESECTOR CUTTER, ARTHROSCOPIC SHAVER BLADE,  DO NOT RESTERILIZE,  DO NOT USE IF PACKAGE IS DAMAGED,  KEEP DRY,  KEEP AWAY FROM SUNLIGHT]: Brand: FORMULA

## (undated) DEVICE — SOLUTION IRRIG 3000ML 0.9% SOD CHL FLX CONT 0797208] ICU MEDICAL INC]

## (undated) DEVICE — (6) MINITAPE (BLUE) 39.5: Brand: MINITAPE

## (undated) DEVICE — ZIMMER® STERILE DISPOSABLE TOURNIQUET CUFF WITH PLC, DUAL PORT, SINGLE BLADDER, 18 IN. (46 CM)

## (undated) DEVICE — DRESSING PETRO W3XL8IN OIL EMUL N ADH GZ KNIT IMPREG CELOS

## (undated) DEVICE — SOLUTION IRRIG 1000ML 0.9% SOD CHL USP POUR PLAS BTL

## (undated) DEVICE — MASTISOL ADHESIVE LIQ 2/3ML

## (undated) DEVICE — SUTURE VCRL SZ 2-0 L27IN ABSRB UD L26MM CT-2 1/2 CIR J269H

## (undated) DEVICE — NDL SPNE QNCKE 18GX3.5IN LF --

## (undated) DEVICE — KIT PROCEDURE SURG HEAD AND NECK TOTE

## (undated) DEVICE — PADDING CAST W2INXL4YD ST COT COHESIVE HND TEARABLE SPEC

## (undated) DEVICE — FOOT DR TOLLISON & WOMACK: Brand: MEDLINE INDUSTRIES, INC.

## (undated) DEVICE — FOOT & ANKLE SOFT DR WOMACK: Brand: MEDLINE INDUSTRIES, INC.

## (undated) DEVICE — BLADE 1882940 5PK INFERIOR TURB 2.9MM

## (undated) DEVICE — SUTURE CHROMIC GUT SZ 4-0 L27IN ABSRB BRN L17MM RB-1 1/2 U203H

## (undated) DEVICE — BLADE 1882040 5PK INFERIOR TURB 2MM

## (undated) DEVICE — (D)PREP SKN CHLRAPRP APPL 26ML -- CONVERT TO ITEM 371833

## (undated) DEVICE — KIT INSTR W/ 2.4MM GUIDEPIN SUT PASS WIRE NO2 FIBERWIRE

## (undated) DEVICE — PRECISION THIN (16.5 X 0.38 X 34.5MM)

## (undated) DEVICE — NEEDLE HYPO 18GA L1.5IN PNK S STL HUB POLYPR SHLD REG BVL

## (undated) DEVICE — STRIP,CLOSURE,WOUND,MEDI-STRIP,1/2X4: Brand: MEDLINE

## (undated) DEVICE — SYR 50ML LR LCK 1ML GRAD NSAF --

## (undated) DEVICE — BANDAGE,GAUZE,CONFORMING,2"X75",STRL,LF: Brand: MEDLINE INDUSTRIES, INC.

## (undated) DEVICE — GOWN,SIRUS,NONRNF,SETINSLV,XL,20/CS: Brand: MEDLINE

## (undated) DEVICE — PRECISION THIN (9.0 X 0.38 X 31.0MM)

## (undated) DEVICE — STOCKINETTE,IMPERVIOUS,12X48,STERILE: Brand: MEDLINE

## (undated) DEVICE — [AGGRESSIVE 6-FLUTE BARREL BUR, ARTHROSCOPIC SHAVER BLADE,  DO NOT RESTERILIZE,  DO NOT USE IF PACKAGE IS DAMAGED,  KEEP DRY,  KEEP AWAY FROM SUNLIGHT]: Brand: FORMULA

## (undated) DEVICE — BANDAGE COMPR L5YDXW2IN FOAM CO FLX

## (undated) DEVICE — STERILE POLYISOPRENE POWDER-FREE SURGICAL GLOVES: Brand: PROTEXIS

## (undated) DEVICE — TUBING, SUCTION, 1/4" X 10', STRAIGHT: Brand: MEDLINE

## (undated) DEVICE — SPLINT THMB W4XL30IN FBRGLS PD PRECUT LTWT DURABLE FAST SET

## (undated) DEVICE — GARMENT,MEDLINE,DVT,INT,CALF,MED, GEN2: Brand: MEDLINE

## (undated) DEVICE — SPLINT NSL SEPTAL SUPP REG PRE PUNCHED HOLE SIL STRL BRTH EZ

## (undated) DEVICE — GUIDEWIRE ORTH L12IN DIA2.4MM NTHRD TRCR TIP DISP FOR 5TH

## (undated) DEVICE — GLOVE SURG SZ 65 L12IN FNGR THK79MIL GRN LTX FREE

## (undated) DEVICE — SPONGE,NEURO,1"X3",XR,STRL,LF,10/PK: Brand: MEDLINE

## (undated) DEVICE — RESTRAINT UNIVERSAL HEAD DISP --

## (undated) DEVICE — SUTURE MCRYL SZ 3-0 L27IN ABSRB UD L19MM PS-2 3/8 CIR PRIM Y427H

## (undated) DEVICE — BANDAGE,ELASTIC,ESMARK,STERILE,4"X9',LF: Brand: MEDLINE

## (undated) DEVICE — SOFT SILICONE HYDROCELLULAR FOAM DRESSING WITH LOCK AWAY LAYER: Brand: ALLEVYN LIFE XL 21X21 CTN10

## (undated) DEVICE — SET IRRIG DST FLX M CONN

## (undated) DEVICE — GLOVE SURG SZ 8 L12IN FNGR THK79MIL GRN LTX FREE

## (undated) DEVICE — SUTURE FIBERWIRE SZ 2 W/ TAPERED NEEDLE BLUE L38IN NONABSORB BLU L26.5MM 1/2 CIRCLE AR7200

## (undated) DEVICE — SHLDR ARTHO LEE: Brand: MEDLINE INDUSTRIES, INC.

## (undated) DEVICE — (6) MINITAPE (COBRAID BLUE) 39.5: Brand: MINITAPE

## (undated) DEVICE — GLOVE ORANGE PI 8 1/2   MSG9085

## (undated) DEVICE — 3M™ TEGADERM™ TRANSPARENT FILM DRESSING FRAME STYLE, 1626W, 4 IN X 4-3/4 IN (10 CM X 12 CM), 50/CT 4CT/CASE: Brand: 3M™ TEGADERM™